# Patient Record
Sex: MALE | Race: BLACK OR AFRICAN AMERICAN | Employment: FULL TIME | URBAN - METROPOLITAN AREA
[De-identification: names, ages, dates, MRNs, and addresses within clinical notes are randomized per-mention and may not be internally consistent; named-entity substitution may affect disease eponyms.]

---

## 2017-01-13 ENCOUNTER — APPOINTMENT (OUTPATIENT)
Dept: GENERAL RADIOLOGY | Age: 42
End: 2017-01-13
Attending: PHYSICIAN ASSISTANT
Payer: OTHER MISCELLANEOUS

## 2017-01-13 ENCOUNTER — HOSPITAL ENCOUNTER (EMERGENCY)
Age: 42
Discharge: HOME | End: 2017-01-13
Attending: EMERGENCY MEDICINE
Payer: OTHER MISCELLANEOUS

## 2017-01-13 VITALS
OXYGEN SATURATION: 97 % | SYSTOLIC BLOOD PRESSURE: 117 MMHG | TEMPERATURE: 96.6 F | DIASTOLIC BLOOD PRESSURE: 71 MMHG | RESPIRATION RATE: 16 BRPM | BODY MASS INDEX: 33.34 KG/M2 | WEIGHT: 220 LBS | HEIGHT: 68 IN | HEART RATE: 94 BPM

## 2017-01-13 DIAGNOSIS — S09.92XA NASAL TRAUMA, INITIAL ENCOUNTER: Primary | ICD-10-CM

## 2017-01-13 PROCEDURE — 70160 X-RAY EXAM OF NASAL BONES: CPT

## 2017-01-13 PROCEDURE — 99282 EMERGENCY DEPT VISIT SF MDM: CPT

## 2017-01-13 NOTE — LETTER
[facility] 
[facility] EMERGENCY DEPT 
[address] [facility] [address] 
[phone] Work/School Note Date: 1/13/2017 To Whom It May concern: 
 
Josr Hand was seen and treated today in the emergency room by the following provider(s): 
Attending Provider: Rickey Messer DO Physician Assistant: Aletha Mckeon PA-C. Josr Hand may return to work on 1/16/2017. Sincerely, Aletha Mckeon PA-C
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
## 2017-01-14 NOTE — ED NOTES
I have reviewed discharge instructions with the patient. The patient verbalized understanding. Pt given both verbal and written D/C information and work note. Pt understands to F/U in ED for any new or worsening symptoms. Pt leaving ED now in stable condition, ambulatory, w/ no further questions or concerns at this time.
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
## 2017-05-07 RX ORDER — HYDROCHLOROTHIAZIDE 25 MG/1
TABLET ORAL
Qty: 90 TAB | Refills: 0 | Status: SHIPPED | COMMUNITY
Start: 2017-05-07 | End: 2017-08-12 | Stop reason: SDUPTHER

## 2017-08-14 RX ORDER — HYDROCHLOROTHIAZIDE 25 MG/1
TABLET ORAL
Qty: 90 TAB | Refills: 0 | Status: SHIPPED | OUTPATIENT
Start: 2017-08-14 | End: 2018-10-18 | Stop reason: ALTCHOICE

## 2018-10-18 ENCOUNTER — OFFICE VISIT (OUTPATIENT)
Dept: FAMILY MEDICINE CLINIC | Age: 43
End: 2018-10-18

## 2018-10-18 ENCOUNTER — HOSPITAL ENCOUNTER (OUTPATIENT)
Dept: LAB | Age: 43
Discharge: HOME | End: 2018-10-18
Payer: COMMERCIAL

## 2018-10-18 VITALS
OXYGEN SATURATION: 97 % | HEART RATE: 98 BPM | TEMPERATURE: 98.1 F | HEIGHT: 68 IN | BODY MASS INDEX: 35.46 KG/M2 | RESPIRATION RATE: 16 BRPM | DIASTOLIC BLOOD PRESSURE: 100 MMHG | WEIGHT: 234 LBS | SYSTOLIC BLOOD PRESSURE: 160 MMHG

## 2018-10-18 DIAGNOSIS — Z13.0 SCREENING FOR DEFICIENCY ANEMIA: ICD-10-CM

## 2018-10-18 DIAGNOSIS — R21 RASH: ICD-10-CM

## 2018-10-18 DIAGNOSIS — I10 ESSENTIAL HYPERTENSION: Primary | ICD-10-CM

## 2018-10-18 DIAGNOSIS — Z12.5 SCREENING FOR MALIGNANT NEOPLASM OF PROSTATE: ICD-10-CM

## 2018-10-18 PROBLEM — E66.01 SEVERE OBESITY (HCC): Status: ACTIVE | Noted: 2018-10-18

## 2018-10-18 PROCEDURE — 36415 COLL VENOUS BLD VENIPUNCTURE: CPT | Performed by: NURSE PRACTITIONER

## 2018-10-18 PROCEDURE — 84153 ASSAY OF PSA TOTAL: CPT | Performed by: NURSE PRACTITIONER

## 2018-10-18 PROCEDURE — 85018 HEMOGLOBIN: CPT | Performed by: NURSE PRACTITIONER

## 2018-10-18 PROCEDURE — 80061 LIPID PANEL: CPT | Performed by: NURSE PRACTITIONER

## 2018-10-18 PROCEDURE — 80053 COMPREHEN METABOLIC PANEL: CPT | Performed by: NURSE PRACTITIONER

## 2018-10-18 RX ORDER — CLOTRIMAZOLE AND BETAMETHASONE DIPROPIONATE 10; .64 MG/G; MG/G
CREAM TOPICAL
Qty: 15 G | Refills: 0 | Status: SHIPPED | OUTPATIENT
Start: 2018-10-18 | End: 2019-06-25 | Stop reason: SDUPTHER

## 2018-10-18 RX ORDER — LOSARTAN POTASSIUM AND HYDROCHLOROTHIAZIDE 25; 100 MG/1; MG/1
1 TABLET ORAL DAILY
Qty: 30 TAB | Refills: 5 | Status: SHIPPED | OUTPATIENT
Start: 2018-10-18 | End: 2018-11-15 | Stop reason: ALTCHOICE

## 2018-10-18 NOTE — PROGRESS NOTES
HISTORY OF PRESENT ILLNESS  Bradly Christopher is a 37 y.o. male. Patient presents for rash  HPI  Rash started about 2 months ago. It does not itch. Pt is not taking his blood pressure medication. He ran out. Review of Systems   Constitutional: Negative. Eyes: Negative. Respiratory: Negative. Cardiovascular: Negative. Skin: Positive for itching and rash. Visit Vitals  BP (!) 160/100 (BP 1 Location: Left arm, BP Patient Position: Sitting)   Pulse 98   Temp 98.1 °F (36.7 °C) (Oral)   Resp 16   Ht 5' 8\" (1.727 m)   Wt 234 lb (106.1 kg)   SpO2 97%   BMI 35.58 kg/m²     Physical Exam   Constitutional: He is oriented to person, place, and time. He appears well-developed. No distress. HENT:   Head: Normocephalic and atraumatic. Right Ear: External ear normal.   Left Ear: External ear normal.   Nose: Nose normal.   Mouth/Throat: Oropharynx is clear and moist. No oropharyngeal exudate. Eyes: Conjunctivae and EOM are normal. Pupils are equal, round, and reactive to light. Neck: Normal range of motion. Neck supple. Cardiovascular: Normal rate and regular rhythm. No murmur heard. Pulmonary/Chest: Breath sounds normal. No respiratory distress. He has no wheezes. He has no rales. Abdominal: Soft. Bowel sounds are normal. He exhibits no distension and no mass. There is no tenderness. There is no rebound and no guarding. Genitourinary: Penis normal.   Musculoskeletal: Normal range of motion. He exhibits no edema. Neurological: He is alert and oriented to person, place, and time. No cranial nerve deficit. Skin: Rash noted. Rash is macular. Rash is not papular, not pustular, not vesicular and not urticarial.   Psychiatric: He has a normal mood and affect. His behavior is normal. Judgment and thought content normal.   on the right flank area there is a cluster of lesions that are round varying in size. ASSESSMENT and PLAN    ICD-10-CM ICD-9-CM    1.  Essential hypertension I10 401.9 losartan-hydroCHLOROthiazide (HYZAAR) 100-25 mg per tablet      METABOLIC PANEL, COMPREHENSIVE      LIPID PANEL   2. Rash R21 782.1    3. Screening for malignant neoplasm of prostate Z12.5 V76.44 PSA SCREENING (SCREENING)   4. Screening for deficiency anemia Z13.0 V78.1 HGB & HCT     PLAN:  We discussed his blood pressure and treatment option. I have reviewed/discussed the above normal BMI with the patient. I have recommended the following interventions: dietary management education, guidance, and counseling, encourage exercise and monitor weight . .      I asked Pt to RTC in 4 weeks for blood pressure recheck. Skin care: Pt advised to use small amount bid until clear. If rash persist or worsens. I have discussed the diagnosis with the patient and the intended plan as seen in the above orders. The patient has received an after-visit summary and questions were answered concerning future plans. I have discussed medication side effects and warnings with the patient as well. Patient will call for further questions. Follow-up Disposition:  Return in about 4 weeks (around 11/15/2018) for blood pressure check.

## 2018-10-18 NOTE — PROGRESS NOTES
Subjective:  
Chela Mcnulty is a 37 y.o. male presenting for his annual checkup. ROS:  Feeling well. No dyspnea or chest pain on exertion. No abdominal pain, change in bowel habits, black or bloody stools. No urinary tract or prostatic symptoms. No neurological complaints. Specific concerns today: ***. 
 
{Choose one or more SmartLinks; press DELETE if none desired:[id number]} {Choose one or more Last Lab values; press DELETE if none desired:[id number]} Objective:  
 
Visit Vitals BP (!) 160/100 (BP 1 Location: Left arm, BP Patient Position: Sitting) Pulse 98 Temp 98.1 °F (36.7 °C) (Oral) Resp 16 Ht 5' 8\" (1.727 m) Wt 234 lb (106.1 kg) SpO2 97% BMI 35.58 kg/m² The patient appears well, alert, oriented x 3, in no distress. ENT normal.  Neck supple. No adenopathy or thyromegaly. MARTI. Lungs are clear, good air entry, no wheezes, rhonchi or rales. S1 and S2 normal, no murmurs, regular rate and rhythm. Abdomen is soft without tenderness, guarding, mass or organomegaly.  exam: {pe male genitalia:804018::\"no penile lesions or discharge, no testicular masses or tenderness, no hernias\"}. Extremities show no edema, normal peripheral pulses. Neurological is normal without focal findings. Assessment/Plan:  
healthy adult male 
{disease follow up plans:475243}. {Assessment and Plan:53695}.

## 2018-10-18 NOTE — PROGRESS NOTES
Chief Complaint   Patient presents with    Well Male     1. Have you been to the ER, urgent care clinic since your last visit? Hospitalized since your last visit? No    2. Have you seen or consulted any other health care providers outside of the [address] [place] [address] since your last visit? Include any pap smears or colon screening.  No

## 2018-10-19 LAB
ALBUMIN SERPL-MCNC: 4.2 G/DL (ref 3.4–5)
ALBUMIN/GLOB SERPL: 1.2 {RATIO} (ref 0.8–1.7)
ALP SERPL-CCNC: 124 U/L (ref 45–117)
ALT SERPL-CCNC: 65 U/L (ref 16–61)
ANION GAP SERPL CALC-SCNC: 6 MMOL/L (ref 3–18)
AST SERPL-CCNC: 39 U/L (ref 15–37)
BILIRUB SERPL-MCNC: 0.5 MG/DL (ref 0.2–1)
BUN SERPL-MCNC: 15 MG/DL (ref 7–18)
BUN/CREAT SERPL: 12 (ref 12–20)
CALCIUM SERPL-MCNC: 8.6 MG/DL (ref 8.5–10.1)
CHLORIDE SERPL-SCNC: 105 MMOL/L (ref 100–108)
CHOLEST SERPL-MCNC: 216 MG/DL
CO2 SERPL-SCNC: 27 MMOL/L (ref 21–32)
CREAT SERPL-MCNC: 1.22 MG/DL (ref 0.6–1.3)
GLOBULIN SER CALC-MCNC: 3.4 G/DL (ref 2–4)
GLUCOSE SERPL-MCNC: 97 MG/DL (ref 74–99)
HCT VFR BLD AUTO: 44.4 % (ref 36–48)
HDLC SERPL-MCNC: 33 MG/DL (ref 40–60)
HDLC SERPL: 6.5 {RATIO} (ref 0–5)
HGB BLD-MCNC: 14.7 G/DL (ref 13–16)
LDLC SERPL CALC-MCNC: 148.4 MG/DL (ref 0–100)
LIPID PROFILE,FLP: ABNORMAL
POTASSIUM SERPL-SCNC: 4.3 MMOL/L (ref 3.5–5.5)
PROT SERPL-MCNC: 7.6 G/DL (ref 6.4–8.2)
PSA SERPL-MCNC: 0.7 NG/ML (ref 0–4)
SODIUM SERPL-SCNC: 138 MMOL/L (ref 136–145)
TRIGL SERPL-MCNC: 173 MG/DL (ref ?–150)
VLDLC SERPL CALC-MCNC: 34.6 MG/DL

## 2018-10-20 NOTE — PROGRESS NOTES
Please advise Pt that his cholesterol is 216 up from 201 and his LDL is 148 up from 126. Remind him to watch his diet. There is no signs of anemia  His PSA is in normal range. His liver functions are slightly off, so ask him to use less tylenol and NSAIDS, if he takes those.

## 2018-11-15 ENCOUNTER — OFFICE VISIT (OUTPATIENT)
Dept: FAMILY MEDICINE CLINIC | Age: 43
End: 2018-11-15

## 2018-11-15 VITALS
HEIGHT: 68 IN | OXYGEN SATURATION: 98 % | RESPIRATION RATE: 18 BRPM | SYSTOLIC BLOOD PRESSURE: 142 MMHG | HEART RATE: 99 BPM | BODY MASS INDEX: 35.92 KG/M2 | DIASTOLIC BLOOD PRESSURE: 79 MMHG | WEIGHT: 237 LBS | TEMPERATURE: 98 F

## 2018-11-15 DIAGNOSIS — I10 ESSENTIAL HYPERTENSION: Primary | ICD-10-CM

## 2018-11-15 RX ORDER — LISINOPRIL AND HYDROCHLOROTHIAZIDE 20; 25 MG/1; MG/1
1 TABLET ORAL DAILY
Qty: 90 TAB | Refills: 1 | Status: SHIPPED | OUTPATIENT
Start: 2018-11-15 | End: 2019-06-25 | Stop reason: SDUPTHER

## 2018-11-15 NOTE — PROGRESS NOTES
HISTORY OF PRESENT ILLNESS  Danielle Aponte is a 37 y.o. male. Patient presents for blood pressure check. HPI  Losartan re-call  Pt was tolerating the Losartan well, with no side effects. Review of Systems   Constitutional: Negative. Eyes: Negative. Respiratory: Negative. Cardiovascular: Negative. Genitourinary: Negative. Visit Vitals  /79 (BP 1 Location: Left arm, BP Patient Position: Sitting)   Pulse 99   Temp 98 °F (36.7 °C) (Oral)   Resp 18   Ht 5' 8\" (1.727 m)   Wt 237 lb (107.5 kg)   SpO2 98%   BMI 36.04 kg/m²     Physical Exam   Constitutional: He appears well-developed. No distress. Cardiovascular: Normal rate, regular rhythm and normal heart sounds. No murmur heard. Pulmonary/Chest: Effort normal and breath sounds normal. No respiratory distress. He has no wheezes. He has no rales. ASSESSMENT and PLAN    ICD-10-CM ICD-9-CM    1. Essential hypertension I10 401.9 lisinopril-hydroCHLOROthiazide (PRINZIDE, ZESTORETIC) 20-25 mg per tablet     PLAN  Because of re-call, pt was switched to Lisinopril 20/HCTZ 25mg    Pt is to call with any concerns. I have discussed the diagnosis with the patient and the intended plan as seen in the above orders. The patient has received an after-visit summary and questions were answered concerning future plans. I have discussed medication side effects and warnings with the patient as well. Patient will call for further questions. Follow-up Disposition:  Return in about 3 months (around 2/15/2019) for chronic care.

## 2018-11-15 NOTE — PROGRESS NOTES
Chief Complaint   Patient presents with    Follow-up     hypertension 4 week follow up     1. Have you been to the ER, urgent care clinic since your last visit? Hospitalized since your last visit? No    2. Have you seen or consulted any other health care providers outside of the [address] [place] [address] since your last visit? Include any pap smears or colon screening.  No

## 2019-05-24 DIAGNOSIS — I10 ESSENTIAL HYPERTENSION: ICD-10-CM

## 2019-05-24 RX ORDER — LISINOPRIL AND HYDROCHLOROTHIAZIDE 20; 25 MG/1; MG/1
TABLET ORAL
Qty: 90 TAB | Refills: 1 | OUTPATIENT
Start: 2019-05-24

## 2019-06-03 DIAGNOSIS — I10 ESSENTIAL HYPERTENSION: ICD-10-CM

## 2019-06-04 RX ORDER — LISINOPRIL AND HYDROCHLOROTHIAZIDE 20; 25 MG/1; MG/1
1 TABLET ORAL DAILY
Qty: 90 TAB | Refills: 1 | OUTPATIENT
Start: 2019-06-04

## 2019-06-25 ENCOUNTER — HOSPITAL ENCOUNTER (OUTPATIENT)
Dept: LAB | Age: 44
Discharge: HOME | End: 2019-06-25
Payer: COMMERCIAL

## 2019-06-25 ENCOUNTER — OFFICE VISIT (OUTPATIENT)
Dept: FAMILY MEDICINE CLINIC | Age: 44
End: 2019-06-25

## 2019-06-25 VITALS
TEMPERATURE: 98.1 F | HEART RATE: 89 BPM | DIASTOLIC BLOOD PRESSURE: 86 MMHG | BODY MASS INDEX: 36.22 KG/M2 | RESPIRATION RATE: 16 BRPM | WEIGHT: 239 LBS | SYSTOLIC BLOOD PRESSURE: 137 MMHG | OXYGEN SATURATION: 98 % | HEIGHT: 68 IN

## 2019-06-25 DIAGNOSIS — I10 ESSENTIAL HYPERTENSION: ICD-10-CM

## 2019-06-25 DIAGNOSIS — E78.2 MIXED HYPERLIPIDEMIA: Primary | ICD-10-CM

## 2019-06-25 DIAGNOSIS — E78.2 MIXED HYPERLIPIDEMIA: ICD-10-CM

## 2019-06-25 DIAGNOSIS — A60.00 GENITAL HERPES: ICD-10-CM

## 2019-06-25 DIAGNOSIS — L30.9 DERMATITIS: ICD-10-CM

## 2019-06-25 DIAGNOSIS — E66.01 SEVERE OBESITY (HCC): ICD-10-CM

## 2019-06-25 LAB
ALBUMIN SERPL-MCNC: 4.5 G/DL (ref 3.4–5)
ALBUMIN/GLOB SERPL: 1.3 {RATIO} (ref 0.8–1.7)
ALP SERPL-CCNC: 102 U/L (ref 45–117)
ALT SERPL-CCNC: 71 U/L (ref 16–61)
ANION GAP SERPL CALC-SCNC: 10 MMOL/L (ref 3–18)
AST SERPL-CCNC: 53 U/L (ref 15–37)
BILIRUB SERPL-MCNC: 0.9 MG/DL (ref 0.2–1)
BUN SERPL-MCNC: 8 MG/DL (ref 7–18)
BUN/CREAT SERPL: 7 (ref 12–20)
CALCIUM SERPL-MCNC: 9.3 MG/DL (ref 8.5–10.1)
CHLORIDE SERPL-SCNC: 102 MMOL/L (ref 100–108)
CHOLEST SERPL-MCNC: 226 MG/DL
CO2 SERPL-SCNC: 27 MMOL/L (ref 21–32)
CREAT SERPL-MCNC: 1.19 MG/DL (ref 0.6–1.3)
GLOBULIN SER CALC-MCNC: 3.4 G/DL (ref 2–4)
GLUCOSE SERPL-MCNC: 118 MG/DL (ref 74–99)
HDLC SERPL-MCNC: 40 MG/DL (ref 40–60)
HDLC SERPL: 5.7 {RATIO} (ref 0–5)
LDLC SERPL CALC-MCNC: 154 MG/DL (ref 0–100)
LIPID PROFILE,FLP: ABNORMAL
POTASSIUM SERPL-SCNC: 3.8 MMOL/L (ref 3.5–5.5)
PROT SERPL-MCNC: 7.9 G/DL (ref 6.4–8.2)
SODIUM SERPL-SCNC: 139 MMOL/L (ref 136–145)
TRIGL SERPL-MCNC: 160 MG/DL (ref ?–150)
VLDLC SERPL CALC-MCNC: 32 MG/DL

## 2019-06-25 PROCEDURE — 80061 LIPID PANEL: CPT

## 2019-06-25 PROCEDURE — 36415 COLL VENOUS BLD VENIPUNCTURE: CPT

## 2019-06-25 PROCEDURE — 80053 COMPREHEN METABOLIC PANEL: CPT

## 2019-06-25 RX ORDER — LISINOPRIL AND HYDROCHLOROTHIAZIDE 20; 25 MG/1; MG/1
1 TABLET ORAL DAILY
Qty: 90 TAB | Refills: 1 | Status: SHIPPED | OUTPATIENT
Start: 2019-06-25 | End: 2019-12-11 | Stop reason: SDUPTHER

## 2019-06-25 RX ORDER — CLOTRIMAZOLE AND BETAMETHASONE DIPROPIONATE 10; .64 MG/G; MG/G
CREAM TOPICAL
Qty: 15 G | Refills: 0 | Status: SHIPPED | OUTPATIENT
Start: 2019-06-25 | End: 2020-01-18 | Stop reason: SDUPTHER

## 2019-06-25 RX ORDER — VALACYCLOVIR HYDROCHLORIDE 1 G/1
TABLET, FILM COATED ORAL
Qty: 30 TAB | Refills: 3 | Status: SHIPPED | OUTPATIENT
Start: 2019-06-25 | End: 2020-06-18 | Stop reason: SDUPTHER

## 2019-06-25 RX ORDER — CLOBETASOL PROPIONATE 0.46 MG/ML
SOLUTION TOPICAL
Qty: 50 ML | Refills: 2 | Status: SHIPPED | OUTPATIENT
Start: 2019-06-25 | End: 2020-07-20 | Stop reason: SDUPTHER

## 2019-06-25 NOTE — PROGRESS NOTES
Chief Complaint   Patient presents with    Follow Up Chronic Condition    Hair/Scalp Problem     itchy     1. Have you been to the ER, urgent care clinic since your last visit? Hospitalized since your last visit? No    2. Have you seen or consulted any other health care providers outside of the [address] [place] [address] since your last visit? Include any pap smears or colon screening.  No

## 2019-06-25 NOTE — PROGRESS NOTES
HISTORY OF PRESENT ILLNESS  Milena Murdock is a 37 y.o. male. Patient present for chronic care. Chief Complaint   Patient presents with    Follow Up Chronic Condition    Hair/Scalp Problem     itchy       HPI  Pt is here for refills. He has a HSV II outbreak about once a year and takes the valtrex just at that time. Review of Systems   Constitutional: Negative. HENT: Negative. Eyes: Negative. Respiratory: Negative. Cardiovascular: Negative. Skin: Positive for itching and rash. Visit Vitals  /86 (BP 1 Location: Left arm, BP Patient Position: Sitting)   Pulse 89   Temp 98.1 °F (36.7 °C) (Oral)   Resp 16   Ht 5' 8\" (1.727 m)   Wt 239 lb (108.4 kg)   SpO2 98%   BMI 36.34 kg/m²     Physical Exam   Constitutional: He is oriented to person, place, and time. He appears well-developed. No distress. Cardiovascular: Normal rate, regular rhythm and normal heart sounds. No murmur heard. Pulmonary/Chest: Effort normal and breath sounds normal. No respiratory distress. He has no wheezes. He has no rales. Neurological: He is alert and oriented to person, place, and time. skin: scalp  There are patches of raised red papules with tiny blister formation on some. I have reviewed/discussed the above normal BMI with the patient. I have recommended the following interventions: dietary management education, guidance, and counseling and encourage exercise . [facility] ASSESSMENT and PLAN    ICD-10-CM ICD-9-CM    1. Mixed hyperlipidemia E78.2 272.2 LIPID PANEL   2. Essential hypertension I10 401.9 lisinopril-hydroCHLOROthiazide (PRINZIDE, ZESTORETIC) 20-25 mg per tablet      METABOLIC PANEL, COMPREHENSIVE   3. Genital herpes A60.00 054.10 valACYclovir (VALTREX) 1 gram tablet   4. Severe obesity ([facility] [address] [address] E66.01 278.01    5. Dermatitis L30.9 692.9 clobetasol (TEMOVATE) 0.05 % external solution     PLAN:  Pt will work on diet and exercise. We discussed HSV treatment for outbreaks verses prophylaxis.   Pt would like to take just during outbreak so he was advised Valtrex 1000mg tid x7 days. I have discussed the diagnosis with the patient and the intended plan as seen in the above orders. The patient has received an after-visit summary and questions were answered concerning future plans. I have discussed medication side effects and warnings with the patient as well. Patient will call for further questions. Follow-up and Dispositions    · Return in about 6 months (around 12/25/2019) for chronic care.

## 2019-12-11 DIAGNOSIS — I10 ESSENTIAL HYPERTENSION: ICD-10-CM

## 2019-12-11 RX ORDER — LISINOPRIL AND HYDROCHLOROTHIAZIDE 20; 25 MG/1; MG/1
1 TABLET ORAL DAILY
Qty: 30 TAB | Refills: 0 | Status: SHIPPED | OUTPATIENT
Start: 2019-12-11 | End: 2020-01-20 | Stop reason: SDUPTHER

## 2019-12-31 ENCOUNTER — OFFICE VISIT (OUTPATIENT)
Dept: FAMILY MEDICINE CLINIC | Age: 44
End: 2019-12-31

## 2019-12-31 DIAGNOSIS — N52.9 ERECTILE DYSFUNCTION, UNSPECIFIED ERECTILE DYSFUNCTION TYPE: ICD-10-CM

## 2019-12-31 DIAGNOSIS — Z91.14 NON COMPLIANCE W MEDICATION REGIMEN: ICD-10-CM

## 2019-12-31 DIAGNOSIS — G89.29 CHRONIC BILATERAL LOW BACK PAIN WITHOUT SCIATICA: Primary | ICD-10-CM

## 2019-12-31 DIAGNOSIS — M54.50 CHRONIC BILATERAL LOW BACK PAIN WITHOUT SCIATICA: Primary | ICD-10-CM

## 2019-12-31 RX ORDER — CYCLOBENZAPRINE HCL 10 MG
10 TABLET ORAL
Qty: 45 TAB | Refills: 0 | Status: SHIPPED | OUTPATIENT
Start: 2019-12-31 | End: 2020-01-20 | Stop reason: ALTCHOICE

## 2019-12-31 RX ORDER — IBUPROFEN 800 MG/1
800 TABLET ORAL
Qty: 45 TAB | Refills: 0 | Status: SHIPPED | OUTPATIENT
Start: 2019-12-31 | End: 2020-01-20 | Stop reason: ALTCHOICE

## 2019-12-31 NOTE — PATIENT INSTRUCTIONS
Erectile Dysfunction: Care Instructions  Your Care Instructions    A man has erectile dysfunction (ED) when he routinely can't get or keep an erection that allows satisfactory sex. He may not be able to have an erection at any time. Or he may not be able to have one that is firm enough or lasts long enough to complete intercourse. ED is not the same as having trouble getting an erection now and then. That's common. It happens to most men at some time. ED can be caused by problems with the blood vessels, nerves, or hormones. It can be caused by diabetes, heart disease, and injuries. Nerve disorders, such as multiple sclerosis or Parkinson's disease, can also cause it. ED can also be caused by medicines, alcohol, and tobacco. Or it may be caused by depression, stress, grief, or relationship problems. Follow-up care is a key part of your treatment and safety. Be sure to make and go to all appointments, and call your doctor if you are having problems. It's also a good idea to know your test results and keep a list of the medicines you take. How can you care for yourself at home?   Lifestyle    · Limit alcohol. Have no more than 2 drinks a day.     · Do not smoke. Smoking makes it harder for the blood vessels in the penis to relax and let blood flow in. If you need help quitting, talk to your doctor about stop-smoking programs and medicines. These can increase your chances of quitting for good.     · Do not use cocaine, heroin, or other illegal drugs.     · Try to reduce stress.     · Give yourself time to adjust to change. Changes in your job, family, relationships, home life, and other areas can cause stress. And stress can cause erection problems.    Work with your partner    · Don't assume that you know what your partner likes when it comes to sex. You may be wrong. Talk about what each of you does and does not enjoy.     · Make time outside of the bedroom to talk about your sex life.  If you avoid sex because you are afraid of having erection problems, your partner may worry that you are no longer interested.     · If you and your partner have trouble talking about sex, see a therapist who can help you talk about it. Reading books with your partner about sexual health may also help.     · Relax. Take time for more foreplay. Worrying about your erections may only make things worse. Medicines    · Tell your doctor about all the medicines that you take. ? Some medicines can cause erection problems. ? Some medicines can have dangerous interactions with medicines that are prescribed for ED, including over-the-counter medicines and herbal products.     · Be safe with medicines. Take your medicines exactly as prescribed. Call your doctor if you think you are having a problem with your medicine.     · Talk to your doctor about trying a medicine to help you keep an erection. This could be a medicine such as Viagra, Levitra, or Cialis. If you have a heart problem, ask your doctor if these are safe for you. Do not take these medicines if you take nitroglycerin or other nitrate medicine. When should you call for help? Call your doctor now or seek immediate medical care if:    · You took a medicine for erectile dysfunction and you have an erection that lasts longer than 3 hours.    Watch closely for changes in your health, and be sure to contact your doctor if you have any problems. Where can you learn more? Go to http://deon-darshan.info/. Enter [id number] [id number] in the search box to learn more about \"Erectile Dysfunction: Care Instructions. \"  Current as of: May 28, 2019  Content Version: 12.2  © 6031-0780 [employer]. Care instructions adapted under license by [employer] (which disclaims liability or warranty for this information).  If you have questions about a medical condition or this instruction, always ask your healthcare professional. [address] any warranty or liability for your use of this information. Learning About Relief for Back Pain  What is back tension and strain? Back strain happens when you overstretch, or pull, a muscle in your back. You may hurt your back in an accident or when you exercise or lift something. Most back pain will get better with rest and time. You can take care of yourself at home to help your back heal.  What can you do first to relieve back pain? When you first feel back pain, try these steps:  · Walk. Take a short walk (10 to 20 minutes) on a level surface (no slopes, hills, or stairs) every 2 to 3 hours. Walk only distances you can manage without pain, especially leg pain. · Relax. Find a comfortable position for rest. Some people are comfortable on the floor or a medium-firm bed with a small pillow under their head and another under their knees. Some people prefer to lie on their side with a pillow between their knees. Don't stay in one position for too long. · Try heat or ice. Try using a heating pad on a low or medium setting, or take a warm shower, for 15 to 20 minutes every 2 to 3 hours. Or you can buy single-use heat wraps that last up to 8 hours. You can also try an ice pack for 10 to 15 minutes every 2 to 3 hours. You can use an ice pack or a bag of frozen vegetables wrapped in a thin towel. There is not strong evidence that either heat or ice will help, but you can try them to see if they help. You may also want to try switching between heat and cold. · Take pain medicine exactly as directed. ? If the doctor gave you a prescription medicine for pain, take it as prescribed. ? If you are not taking a prescription pain medicine, ask your doctor if you can take an over-the-counter medicine. What else can you do? · Stretch and exercise. Exercises that increase flexibility may relieve your pain and make it easier for your muscles to keep your spine in a good, neutral position.  And don't forget to keep walking. · Do self-massage. You can use self-massage to unwind after work or school or to energize yourself in the morning. You can easily massage your feet, hands, or neck. Self-massage works best if you are in comfortable clothes and are sitting or lying in a comfortable position. Use oil or lotion to massage bare skin. · Reduce stress. Back pain can lead to a vicious [place]: Distress about the pain tenses the muscles in your back, which in turn causes more pain. Learn how to relax your mind and your muscles to lower your stress. Where can you learn more? Go to http://deon-darshan.info/. Enter Q238 in the search box to learn more about \"Learning About Relief for Back Pain. \"  Current as of: June 26, 2019  Content Version: 12.2  © 3897-9069 [employer]. Care instructions adapted under license by [employer] (which disclaims liability or warranty for this information). If you have questions about a medical condition or this instruction, always ask your healthcare professional. [address] any warranty or liability for your use of this information.

## 2019-12-31 NOTE — PROGRESS NOTES
Chief Complaint   Patient presents with    Back Pain    Erectile Dysfunction     1. Have you been to the ER, urgent care clinic since your last visit? Hospitalized since your last visit? no    2. Have you seen or consulted any other health care providers outside of the [address] [place] [address] since your last visit? Include any pap smears or colon screening.  no

## 2019-12-31 NOTE — PROGRESS NOTES
HISTORY OF PRESENT ILLNESS  Camilla Morales is a 40 y.o. male. Patient presents today for further evaluation of back pain. States pain has been present for the last year. Comments pain has increased over the last 6 months. States he has a remote injury 3 years ago while working a [facility] [employer] [facility] facility. Per patient he was informed it was pulled muscle at the time. States pain increases with walking and standing. States he works in [employer] and walking the halls and climbing stairs. States when he recently went to [facility] walking became painful after walking for a while. States he has tried resting, otc ibuprofen, lidoderm patches with some improvement. Unsure if he has had imaging in the past.  Comments pain does radiate down bilateral thighs with climbing stairs. Denies weakness. ED: states symptoms have been present for the last year. Comments symptoms are intermittent. States he tried otc treatment without improvement. States he trouble with obtaining and sustaining an erection. Patient admits to not taking his lisinopril hct routinely as instructed. States he thought this may be been causing his ED. No Known Allergies  Current Outpatient Medications   Medication Sig Dispense Refill    lisinopril-hydroCHLOROthiazide (PRINZIDE, ZESTORETIC) 20-25 mg per tablet Take 1 Tab by mouth daily. 30 Tab 0    valACYclovir (VALTREX) 1 gram tablet Take tid x7 days during outbreak.  30 Tab 3    clotrimazole-betamethasone (LOTRISONE) topical cream Use small amount bid until clear 15 g 0    clobetasol (TEMOVATE) 0.05 % external solution Use bid 50 mL 2     Past Medical History:   Diagnosis Date    Genital herpes 3/2/2015    Genital herpes in men     Hypertension     Prediabetes      Social History     Socioeconomic History    Marital status: UNKNOWN     Spouse name: Not on file    Number of children: Not on file    Years of education: Not on file    Highest education level: Not on file   Occupational History    Not on file   Social Needs    Financial resource strain: Not on file    Food insecurity:     Worry: Not on file     Inability: Not on file    Transportation needs:     Medical: Not on file     Non-medical: Not on file   Tobacco Use    Smoking status: Never Smoker    Smokeless tobacco: Never Used   Substance and Sexual Activity    Alcohol use: No    Drug use: No    Sexual activity: Not on file   Lifestyle    Physical activity:     Days per week: Not on file     Minutes per session: Not on file    Stress: Not on file   Relationships    Social connections:     Talks on phone: Not on file     Gets together: Not on file     Attends [employer] service: Not on file     Active member of club or organization: Not on file     Attends meetings of clubs or organizations: Not on file     Relationship status: Not on file    Intimate partner violence:     Fear of current or ex partner: Not on file     Emotionally abused: Not on file     Physically abused: Not on file     Forced sexual activity: Not on file   Other Topics Concern    Not on file   Social History Narrative    Not on file     Wt Readings from Last 3 Encounters:   12/31/19 246 lb (111.6 kg)   06/25/19 239 lb (108.4 kg)   11/15/18 237 lb (107.5 kg)     BP Readings from Last 3 Encounters:   12/31/19 164/90   06/25/19 137/86   11/15/18 142/79     Review of Systems   Respiratory: Negative for shortness of breath. Cardiovascular: Negative for chest pain and palpitations. Musculoskeletal: Positive for back pain (lower). Neurological: Negative for focal weakness. /90 (BP 1 Location: Right arm, BP Patient Position: Sitting)   Pulse 87   Temp 98.2 °F (36.8 °C) (Oral)   Resp 18   Ht 5' 8\" (1.727 m)   Wt 246 lb (111.6 kg)   SpO2 98%   BMI 37.40 kg/m²     Physical Exam  Constitutional:       Appearance: He is well-developed. HENT:      Head: Normocephalic and atraumatic.    Neck:      Musculoskeletal: Normal range of motion and neck supple. Cardiovascular:      Rate and Rhythm: Normal rate and regular rhythm. Heart sounds: Normal heart sounds. No murmur. No friction rub. No gallop. Pulmonary:      Effort: Pulmonary effort is normal.      Breath sounds: Normal breath sounds. No wheezing, rhonchi or rales. Musculoskeletal:      Lumbar back: He exhibits tenderness. Skin:     General: Skin is warm and dry. Neurological:      Mental Status: He is alert and oriented to person, place, and time. ASSESSMENT and PLAN    ICD-10-CM ICD-9-CM    1. Chronic bilateral low back pain without sciatica M54.5 724.2 XR SPINE LUMB MIN 4 V    G89.29 338.29    2. Erectile dysfunction, unspecified erectile dysfunction type N52.9 607.84    3. Non compliance w medication regimen Z91.14 V15.81      Orders Placed This Encounter    XR SPINE LUMB MIN 4 V    ibuprofen (MOTRIN) 800 mg tablet    cyclobenzaprine (FLEXERIL) 10 mg tablet       Discussed risk associated with uncontrolled blood pressure to include but not limited to MI, CVA, etc  Informed will need to have blood pressure better controlled prior to beginning any medication for ED   Stretches for back pain provided and reviewed  I have discussed the diagnosis with the patient and the intended plan as seen in the above orders. The patient has received an after-visit summary and questions were answered concerning future plans. I have discussed medication side effects and warnings with the patient as well. Patient agreeable with above plan and verbalizes understanding. Follow-up and Dispositions    · Return in about 3 weeks (around 1/21/2020) for back pain.

## 2020-01-05 VITALS
SYSTOLIC BLOOD PRESSURE: 164 MMHG | HEART RATE: 87 BPM | DIASTOLIC BLOOD PRESSURE: 100 MMHG | OXYGEN SATURATION: 98 % | RESPIRATION RATE: 18 BRPM | BODY MASS INDEX: 37.28 KG/M2 | WEIGHT: 246 LBS | TEMPERATURE: 98.2 F | HEIGHT: 68 IN

## 2020-01-15 ENCOUNTER — HOSPITAL ENCOUNTER (OUTPATIENT)
Dept: GENERAL RADIOLOGY | Age: 45
Discharge: HOME | End: 2020-01-15
Payer: COMMERCIAL

## 2020-01-15 DIAGNOSIS — G89.29 CHRONIC BILATERAL LOW BACK PAIN WITHOUT SCIATICA: ICD-10-CM

## 2020-01-15 DIAGNOSIS — M54.50 CHRONIC BILATERAL LOW BACK PAIN WITHOUT SCIATICA: ICD-10-CM

## 2020-01-15 PROCEDURE — 72110 X-RAY EXAM L-2 SPINE 4/>VWS: CPT

## 2020-01-20 ENCOUNTER — OFFICE VISIT (OUTPATIENT)
Dept: FAMILY MEDICINE CLINIC | Age: 45
End: 2020-01-20

## 2020-01-20 VITALS
WEIGHT: 246.6 LBS | OXYGEN SATURATION: 98 % | SYSTOLIC BLOOD PRESSURE: 148 MMHG | BODY MASS INDEX: 37.38 KG/M2 | DIASTOLIC BLOOD PRESSURE: 89 MMHG | HEART RATE: 93 BPM | TEMPERATURE: 98.3 F | RESPIRATION RATE: 16 BRPM | HEIGHT: 68 IN

## 2020-01-20 DIAGNOSIS — M54.50 CHRONIC BILATERAL LOW BACK PAIN WITHOUT SCIATICA: ICD-10-CM

## 2020-01-20 DIAGNOSIS — N52.9 ERECTILE DYSFUNCTION, UNSPECIFIED ERECTILE DYSFUNCTION TYPE: Primary | ICD-10-CM

## 2020-01-20 DIAGNOSIS — G89.29 CHRONIC BILATERAL LOW BACK PAIN WITHOUT SCIATICA: ICD-10-CM

## 2020-01-20 DIAGNOSIS — I10 ESSENTIAL HYPERTENSION: ICD-10-CM

## 2020-01-20 RX ORDER — LISINOPRIL AND HYDROCHLOROTHIAZIDE 20; 25 MG/1; MG/1
1 TABLET ORAL DAILY
Qty: 90 TAB | Refills: 1 | Status: SHIPPED | OUTPATIENT
Start: 2020-01-20 | End: 2020-07-20 | Stop reason: SDUPTHER

## 2020-01-20 RX ORDER — CLOTRIMAZOLE AND BETAMETHASONE DIPROPIONATE 10; .64 MG/G; MG/G
CREAM TOPICAL
Qty: 15 G | Refills: 0 | Status: SHIPPED | OUTPATIENT
Start: 2020-01-20 | End: 2020-02-08 | Stop reason: SDUPTHER

## 2020-01-20 RX ORDER — TADALAFIL 20 MG/1
20 TABLET ORAL AS NEEDED
Qty: 9 TAB | Refills: 5 | Status: SHIPPED | OUTPATIENT
Start: 2020-01-20 | End: 2020-05-04 | Stop reason: SDUPTHER

## 2020-01-20 RX ORDER — NAPROXEN SODIUM 550 MG/1
550 TABLET ORAL 2 TIMES DAILY WITH MEALS
Qty: 30 TAB | Refills: 1 | Status: SHIPPED | OUTPATIENT
Start: 2020-01-20 | End: 2020-05-04 | Stop reason: SDUPTHER

## 2020-01-20 RX ORDER — ORPHENADRINE CITRATE 100 MG/1
100 TABLET, EXTENDED RELEASE ORAL 2 TIMES DAILY
Qty: 30 TAB | Refills: 1 | Status: SHIPPED | OUTPATIENT
Start: 2020-01-20 | End: 2020-05-04 | Stop reason: SDUPTHER

## 2020-01-20 NOTE — TELEPHONE ENCOUNTER
Last Visit: 12/31/19 with JABARI Rod  Next Appointment: today 1/20/20 with JABARI Ray  Previous Refill Encounter(s): 6/25/19 #15g    Requested Prescriptions     Pending Prescriptions Disp Refills    clotrimazole-betamethasone (LOTRISONE) topical cream 15 g 0     Sig: Use small amount bid until clear

## 2020-01-20 NOTE — PROGRESS NOTES
Pt is here for follow up on back pain  Discuss ED    1. Have you been to the ER, urgent care clinic since your last visit? Hospitalized since your last visit? No    2. Have you seen or consulted any other health care providers outside of the [address] [place] [address] since your last visit? Include any pap smears or colon screening.  No

## 2020-01-20 NOTE — PROGRESS NOTES
HISTORY OF PRESENT ILLNESS  Brianne Barnett is a 40 y.o. male. Patient presents for follow up back pain. HPI  Pt was given Motrin and Flexeril. The flexeril makes him tired  His back has been steadily worsening over the months. Pt would like to talk about ED    He needs refill on his blood pressure medicine. Review of Systems   Constitutional: Negative. Respiratory: Negative. Cardiovascular: Negative. Musculoskeletal: Positive for back pain. Visit Vitals  /89 (BP 1 Location: Left arm)   Pulse 93   Temp 98.3 °F (36.8 °C) (Oral)   Resp 16   Ht 5' 8\" (1.727 m)   Wt 246 lb 9.6 oz (111.9 kg)   SpO2 98%   BMI 37.50 kg/m²       Physical Exam  Constitutional:       General: He is not in acute distress. Appearance: Normal appearance. He is not ill-appearing. Cardiovascular:      Rate and Rhythm: Normal rate and regular rhythm. Heart sounds: No murmur. Pulmonary:      Effort: Pulmonary effort is normal. No respiratory distress. Breath sounds: Normal breath sounds. No stridor. No wheezing, rhonchi or rales. Neurological:      Mental Status: He is alert and oriented to person, place, and time. ASSESSMENT and PLAN    ICD-10-CM ICD-9-CM    1. Erectile dysfunction, unspecified erectile dysfunction type N52.9 607.84 tadalafil (CIALIS) 20 mg tablet   2. Essential hypertension I10 401.9 lisinopril-hydroCHLOROthiazide (PRINZIDE, ZESTORETIC) 20-25 mg per tablet      LIPID PANEL      METABOLIC PANEL, COMPREHENSIVE      METABOLIC PANEL, COMPREHENSIVE      LIPID PANEL   3. Chronic bilateral low back pain without sciatica M54.5 724.2 REFERRAL TO ORTHOPEDICS    G89.29 338.29      PLAN:  Back:  Pt will stop the Motrin and Flexeril. He will start Anaprox and Norflex bid. Pt ref to ortho. We discussed ED and treatment option. Pt is aware this may not be covered by his insurance. I have discussed the diagnosis with the patient and the intended plan as seen in the above orders.   The patient has received an after-visit summary and questions were answered concerning future plans. I have discussed medication side effects and warnings with the patient as well. Patient will call for further questions. Follow-up and Dispositions    · Return in about 6 months (around 7/20/2020) for chronic care.

## 2020-01-21 LAB
A-G RATIO,AGRAT: 1.6 RATIO (ref 1.1–2.6)
ALBUMIN SERPL-MCNC: 4.7 G/DL (ref 3.5–5)
ALP SERPL-CCNC: 101 U/L (ref 25–115)
ALT SERPL-CCNC: 78 U/L (ref 5–40)
ANION GAP SERPL CALC-SCNC: 14 MMOL/L
AST SERPL W P-5'-P-CCNC: 42 U/L (ref 10–37)
BILIRUB SERPL-MCNC: 0.4 MG/DL (ref 0.2–1.2)
BUN SERPL-MCNC: 11 MG/DL (ref 6–22)
CALCIUM SERPL-MCNC: 9.5 MG/DL (ref 8.4–10.5)
CHLORIDE SERPL-SCNC: 98 MMOL/L (ref 98–110)
CHOLEST SERPL-MCNC: 225 MG/DL (ref 110–200)
CO2 SERPL-SCNC: 26 MMOL/L (ref 20–32)
CREAT SERPL-MCNC: 1.2 MG/DL (ref 0.5–1.2)
GFRAA, 66117: >60
GFRNA, 66118: >60
GLOBULIN,GLOB: 2.9 G/DL (ref 2–4)
GLUCOSE SERPL-MCNC: 185 MG/DL (ref 70–99)
HDLC SERPL-MCNC: 34 MG/DL
HDLC SERPL-MCNC: 6.6 MG/DL (ref 0–5)
LDL/HDL RATIO,LDHD: 4.7
LDLC SERPL CALC-MCNC: 161 MG/DL (ref 50–99)
NON-HDL CHOLESTEROL, 011976: 191 MG/DL
POTASSIUM SERPL-SCNC: 4.1 MMOL/L (ref 3.5–5.5)
PROT SERPL-MCNC: 7.6 G/DL (ref 6.4–8.3)
SODIUM SERPL-SCNC: 138 MMOL/L (ref 133–145)
TRIGL SERPL-MCNC: 152 MG/DL (ref 40–149)
VLDLC SERPL CALC-MCNC: 30 MG/DL (ref 8–30)

## 2020-02-08 DIAGNOSIS — N52.9 ERECTILE DYSFUNCTION, UNSPECIFIED ERECTILE DYSFUNCTION TYPE: ICD-10-CM

## 2020-02-08 RX ORDER — TADALAFIL 20 MG/1
20 TABLET ORAL AS NEEDED
Qty: 9 TAB | Refills: 5 | Status: CANCELLED | OUTPATIENT
Start: 2020-02-08

## 2020-02-10 RX ORDER — CLOTRIMAZOLE AND BETAMETHASONE DIPROPIONATE 10; .64 MG/G; MG/G
CREAM TOPICAL
Qty: 15 G | Refills: 0 | Status: SHIPPED | OUTPATIENT
Start: 2020-02-10

## 2020-02-10 NOTE — TELEPHONE ENCOUNTER
Last Visit: 1/20/20 with JABARI Ray  Next Appointment: 7/20/20 with JABARI Ray  Previous Refill Encounter(s): 1/20/20 #15g    Requested Prescriptions     Pending Prescriptions Disp Refills    clotrimazole-betamethasone (LOTRISONE) topical cream 15 g 0     Sig: Use small amount bid until clear     Patient still has refills at the pharmacy for Cialis. I contacted the pharmacy to confirm and requested they refill it for the patient.

## 2020-05-04 DIAGNOSIS — I10 ESSENTIAL HYPERTENSION: ICD-10-CM

## 2020-05-04 RX ORDER — LISINOPRIL AND HYDROCHLOROTHIAZIDE 20; 25 MG/1; MG/1
1 TABLET ORAL DAILY
Qty: 90 TAB | Refills: 0 | OUTPATIENT
Start: 2020-05-04

## 2020-05-04 RX ORDER — NAPROXEN SODIUM 550 MG/1
550 TABLET ORAL 2 TIMES DAILY WITH MEALS
Qty: 30 TAB | Refills: 0 | Status: SHIPPED | OUTPATIENT
Start: 2020-05-04 | End: 2020-05-26

## 2020-05-04 RX ORDER — ORPHENADRINE CITRATE 100 MG/1
100 TABLET, EXTENDED RELEASE ORAL 2 TIMES DAILY
Qty: 30 TAB | Refills: 0 | Status: SHIPPED | OUTPATIENT
Start: 2020-05-04 | End: 2020-05-26

## 2020-05-04 NOTE — TELEPHONE ENCOUNTER
Last visit 01/20/2020 NP Reyes Quan   Next appointment 07/10/2020 NP Cory   Previous refill encounter(s) 01/20/2020 Zestoretic #90 with 1 refill, 01/20/2020 Norflex #30 with 1 refill, 01/20/2020 Anaprox #30 with 1 refill     Requested Prescriptions     Pending Prescriptions Disp Refills    lisinopril-hydroCHLOROthiazide (PRINZIDE, ZESTORETIC) 20-25 mg per tablet 90 Tab 0     Sig: Take 1 Tab by mouth daily.  orphenadrine citrate (NORFLEX) 100 mg sr tablet 30 Tab 0     Sig: Take 1 Tab by mouth two (2) times a day.  naproxen sodium (ANAPROX) 550 mg tablet 30 Tab 0     Sig: Take 1 Tab by mouth two (2) times daily (with meals).

## 2020-05-26 RX ORDER — ORPHENADRINE CITRATE 100 MG/1
TABLET, EXTENDED RELEASE ORAL
Qty: 30 TAB | Refills: 0 | Status: SHIPPED | OUTPATIENT
Start: 2020-05-26

## 2020-05-26 RX ORDER — NAPROXEN SODIUM 550 MG/1
TABLET ORAL
Qty: 30 TAB | Refills: 0 | Status: SHIPPED | OUTPATIENT
Start: 2020-05-26 | End: 2020-07-12

## 2020-06-15 ENCOUNTER — OFFICE VISIT (OUTPATIENT)
Dept: ORTHOPEDIC SURGERY | Age: 45
End: 2020-06-15

## 2020-06-15 VITALS
BODY MASS INDEX: 37.89 KG/M2 | DIASTOLIC BLOOD PRESSURE: 84 MMHG | WEIGHT: 250 LBS | HEIGHT: 68 IN | SYSTOLIC BLOOD PRESSURE: 167 MMHG | HEART RATE: 90 BPM

## 2020-06-15 DIAGNOSIS — M51.36 DDD (DEGENERATIVE DISC DISEASE), LUMBAR: ICD-10-CM

## 2020-06-15 DIAGNOSIS — E66.01 SEVERE OBESITY (BMI 35.0-39.9) WITH COMORBIDITY (HCC): ICD-10-CM

## 2020-06-15 DIAGNOSIS — G89.29 CHRONIC MIDLINE LOW BACK PAIN WITHOUT SCIATICA: ICD-10-CM

## 2020-06-15 DIAGNOSIS — M62.838 MUSCLE SPASM: ICD-10-CM

## 2020-06-15 DIAGNOSIS — M54.50 CHRONIC MIDLINE LOW BACK PAIN WITHOUT SCIATICA: ICD-10-CM

## 2020-06-15 DIAGNOSIS — M47.816 LUMBAR FACET ARTHROPATHY: Primary | ICD-10-CM

## 2020-06-15 NOTE — LETTER
6/15/20 Patient: Roque Coffey YOB: 1975 Date of Visit: 6/15/2020 Gregorio Barrera NP 
[address] [address] VIA In Basket Dear Gregorio Barrera NP, Thank you for referring Mr. Roque Coffey to [address] [place] [address] for evaluation. My notes for this consultation are attached. If you have questions, please do not hesitate to call me. I look forward to following your patient along with you. Sincerely, Rafa Ortiz MD
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
## 2020-06-15 NOTE — PATIENT INSTRUCTIONS
Low Back Arthritis: Exercises Introduction Here are some examples of typical rehabilitation exercises for your condition. Start each exercise slowly. Ease off the exercise if you start to have pain. Your doctor or physical therapist will tell you when you can start these exercises and which ones will work best for you. When you are not being active, find a comfortable position for rest. Some people are comfortable on the floor or a medium-firm bed with a small pillow under their head and another under their knees. Some people prefer to lie on their side with a pillow between their knees. Don't stay in one position for too long. Take short walks (10 to 20 minutes) every 2 to 3 hours. Avoid slopes, hills, and stairs until you feel better. Walk only distances you can manage without pain, especially leg pain. How to do the exercises Pelvic tilt 1. Lie on your back with your knees bent. 2. \"Brace\" your stomachtighten your muscles by pulling in and imagining your belly button moving toward your spine. 3. Press your lower back into the floor. You should feel your hips and pelvis rock back. 4. Hold for 6 seconds while breathing smoothly. 5. Relax and allow your pelvis and hips to rock forward. 6. Repeat 8 to 12 times. Back stretches 1. Get down on your hands and knees on the floor. 2. Relax your head and allow it to droop. Round your back up toward the ceiling until you feel a nice stretch in your upper, middle, and lower back. Hold this stretch for as long as it feels comfortable, or about 15 to 30 seconds. 3. Return to the starting position with a flat back while you are on your hands and knees. 4. Let your back sway by pressing your stomach toward the floor. Lift your buttocks toward the ceiling. 5. Hold this position for 15 to 30 seconds. 6. Repeat 2 to 4 times. Follow-up care is a key part of your treatment and safety.  Be sure to make and go to all appointments, and call your doctor if you are having problems. It's also a good idea to know your test results and keep a list of the medicines you take. Where can you learn more? Go to http://deon-darshan.info/ Enter I876 in the search box to learn more about \"Low Back Arthritis: Exercises. \" Current as of: March 2, 2020               Content Version: 12.5 © 2006-2020 [employer]. Care instructions adapted under license by [employer] (which disclaims liability or warranty for this information). If you have questions about a medical condition or this instruction, always ask your healthcare professional. [address] any warranty or liability for your use of this information.

## 2020-06-18 DIAGNOSIS — N52.9 ERECTILE DYSFUNCTION, UNSPECIFIED ERECTILE DYSFUNCTION TYPE: ICD-10-CM

## 2020-06-18 DIAGNOSIS — A60.00 GENITAL HERPES: ICD-10-CM

## 2020-06-18 RX ORDER — TADALAFIL 20 MG/1
20 TABLET ORAL AS NEEDED
Qty: 9 TAB | Refills: 5 | Status: CANCELLED | OUTPATIENT
Start: 2020-06-18

## 2020-06-18 RX ORDER — VALACYCLOVIR HYDROCHLORIDE 1 G/1
TABLET, FILM COATED ORAL
Qty: 30 TAB | Refills: 3 | Status: SHIPPED | OUTPATIENT
Start: 2020-06-18

## 2020-06-18 NOTE — TELEPHONE ENCOUNTER
Patient still has refills at the pharmacy for Cialis. I contacted the pharmacy to confirm and requested they refill it for the patient. No further action needed.

## 2020-06-18 NOTE — TELEPHONE ENCOUNTER
Last Visit: 1/20/20 with JABARI Ray  Next Appointment: 7/20/20 with JABARI Ray  Previous Refill Encounter(s): 6/25/19 #30 with 3 refills    Requested Prescriptions     Pending Prescriptions Disp Refills    valACYclovir (VALTREX) 1 gram tablet 30 Tab 3     Sig: Take tid x7 days during outbreak.

## 2020-07-12 RX ORDER — NAPROXEN SODIUM 550 MG/1
TABLET ORAL
Qty: 30 TAB | Refills: 0 | Status: SHIPPED | OUTPATIENT
Start: 2020-07-12 | End: 2020-08-19

## 2020-07-17 ENCOUNTER — HOSPITAL ENCOUNTER (OUTPATIENT)
Dept: PHYSICAL THERAPY | Age: 45
Discharge: HOME | End: 2020-07-17
Payer: COMMERCIAL

## 2020-07-17 PROCEDURE — 97162 PT EVAL MOD COMPLEX 30 MIN: CPT

## 2020-07-17 PROCEDURE — 97535 SELF CARE MNGMENT TRAINING: CPT

## 2020-07-17 PROCEDURE — 97110 THERAPEUTIC EXERCISES: CPT

## 2020-07-17 NOTE — PROGRESS NOTES
PT DAILY TREATMENT NOTE 10-18    Patient Name: Domonique Beltrán  Date:2020  : 1975  [x]  Patient  Verified  Payor: Jay Artist / Plan: [employer] / Product Type: HMO /    In time:1015  Out time:1056  Total Treatment Time (min): 41  Visit #: 1 of 4-8    Treatment Area: Low back pain [M54.5]    SUBJECTIVE  Pain Level (0-10 scale): 0  Any medication changes, allergies to medications, adverse drug reactions, diagnosis change, or new procedure performed?: [x] No    [] Yes (see summary sheet for update)  Subjective functional status/changes:   [] No changes reported  No pain at rest, pain with prolonged amb and standing. OBJECTIVE    15 min [x]Eval                  []Re-Eval       15 min Therapeutic Exercise:  HEP instruction and performance   Rationale: increase ROM and increase strength to improve the patients ability to ambulate with reduced pain    11 min Self Care/Home Management:  Educated pt about relevant anatomy and pathology as well as goals for POC and importance of regular HEP compliance to achieve success. Rationale: increase ROM  to improve the patients ability to ambulate with reduced pain           With   [x] TE   [] TA   [] neuro   [] other: Patient Education: [x] Review HEP    [] Progressed/Changed HEP based on:   [] positioning   [] body mechanics   [] transfers   [] heat/ice application    [] other:      Other Objective/Functional Measures: see paper chart     Pain Level (0-10 scale) post treatment: 0    ASSESSMENT/Changes in Function: Pt is a 58-year-old male presenting to the clinic with c/o LBP with prolonged walking and standing. Pt has some posterior thigh shooting pains with occasional burning/tingling after walking for 20 minutes or longer. Pt denies any pain or LE symptoms at rest. Repeated movements into flexion and extension do not provoke symptoms. Some neural tension of the sciatic nerve is elicited with B slump testing. SLR and SIJ cluster testing is negative bilaterally. Pt denies any muscular cramping pains with ambulation. Present impairments are inclusive of decreased lumbar ROM in all directions, decreased flexibility, and decreased B LE strength. Pt would benefit from Aquatic Therapy to improve present impairments followed by progression to land therapy to further work on abdominal strengthening for improved stability. Pt provided with an HEP focusing on stretching. Patient will benefit from skilled PT services to modify and progress therapeutic interventions, address functional mobility deficits, address ROM deficits, address strength deficits, analyze and address soft tissue restrictions, analyze and cue movement patterns, analyze and modify body mechanics/ergonomics and assess and modify postural abnormalities to attain remaining goals. [x]  See Plan of Care  []  See progress note/recertification  []  See Discharge Summary         Progress towards goals / Updated goals:  Short Term Goals: To be accomplished in 2 weeks:  1. Pt will be I and compliant with HEP to maximize therapeutic success. Eval: HEP assigned  2. Pt will indicate no increase in LBP following therapy to optimize goal progression. Eval: 0/10  Long Term Goals: To be accomplished in 4 weeks:  1. Pt will progress to land therapy and complete session without increase in LBP. Eval: not observed  2. Pt will be I and compliant with an aquatic HEP to be performed at his apartment complex to maximize therapeutic success. Eval: aquatic PT with land HEP  3. Pt will report walking for 20 minutes with no more than 3/10 low back pain to improve ease of ADLs. Eval: 8/10 with 20 minutes walking  4. Pt will improve lumbar ROM in all planes to greater than 75% to improve ease of ADLs. Eval: 50-75% in all planes  5. Pt will improve B hip and knee strength to 4+/5 or better to improve ease of ambulation.    Eval: B hip flexion 4/5, extension 4+/5, knee extension/flexion 4/5    PLAN  []  Upgrade activities as tolerated     [x]  Continue plan of care  []  Update interventions per flow sheet       []  Discharge due to:_  []  Other:_      Rochelle Alonzo, PT 7/17/2020  11:02 AM    Future Appointments   Date Time Provider Shweta Allison   7/20/2020  3:00 PM Marcelino Ray, JABARI [facility]   7/22/2020  5:45 PM Al Lowery, PTA [facility]   7/28/2020  1:15 PM Johnnie Ghosh  [facility]   7/31/2020  1:15 PM Angela Carranza, PT [facility]   8/7/2020  3:45 PM Angela Carranza, PT [facility]   8/14/2020  4:30 PM Angela Carranza, PT [facility]

## 2020-07-17 NOTE — PROGRESS NOTES
[facility]  [address]  [place], [address]  [phone] fax    Plan of Care/ Statement of Necessity for Physical Therapy Services    Patient name: Jonah Chase Start of Care: 2020   Referral source: Teresa Espinoza MD : 1975    Medical Diagnosis: Low back pain [M54.5]  Payor: Ruben Mccurdy / Plan: Yomi [place] / Product Type: HMO /  Onset Date:2 years, progressively worsening    Treatment Diagnosis: LBP   Prior Hospitalization: see medical history Provider#: [id number]   Medications: Verified on Patient summary List    Comorbidities: back pain, HBP   Prior Level of Function: I with ADLs     The Plan of Care and following information is based on the information from the initial evaluation. Assessment/ key information: Pt is a 43-year-old male presenting to the clinic with c/o LBP with prolonged walking and standing. Pt has some posterior thigh shooting pains with occasional burning/tingling after walking for 20 minutes or longer. Pt denies any pain or LE symptoms at rest. Repeated movements into flexion and extension do not provoke symptoms. Some neural tension of the sciatic nerve is elicited with B slump testing. SLR and SIJ cluster testing is negative bilaterally. Pt denies any muscular cramping pains with ambulation. Present impairments are inclusive of decreased lumbar ROM in all directions, decreased flexibility, and decreased B LE strength. Pt would benefit from Aquatic Therapy to improve present impairments followed by progression to land therapy to further work on abdominal strengthening for improved stability. Pt provided with an HEP focusing on stretching.  Patient will benefit from skilled PT services to modify and progress therapeutic interventions, address functional mobility deficits, address ROM deficits, address strength deficits, analyze and address soft tissue restrictions, analyze and cue movement patterns, analyze and modify body mechanics/ergonomics and assess and modify postural abnormalities to attain remaining goals. Evaluation Complexity History MEDIUM  Complexity : 1-2 comorbidities / personal factors will impact the outcome/ POC ; Examination MEDIUM Complexity : 3 Standardized tests and measures addressing body structure, function, activity limitation and / or participation in recreation  ;Presentation MEDIUM Complexity : Evolving with changing characteristics  ; Clinical Decision Making MEDIUM Complexity : FOTO score of 26-74  Overall Complexity Rating: MEDIUM  Problem List: pain affecting function, decrease ROM, decrease strength, impaired gait/ balance, decrease ADL/ functional abilitiies, decrease activity tolerance and decrease flexibility/ joint mobility   Treatment Plan may include any combination of the following: Therapeutic exercise, Therapeutic activities, Neuromuscular re-education, Physical agent/modality, Gait/balance training, Manual therapy, Aquatic therapy, Patient education, Self Care training and Functional mobility training  Patient / Family readiness to learn indicated by: asking questions, trying to perform skills and interest  Persons(s) to be included in education: patient (P)  Barriers to Learning/Limitations: None  Patient Goal (s): end back pain, better mobility  Patient Self Reported Health Status: fair  Rehabilitation Potential: good    Short Term Goals: To be accomplished in 2 weeks:  1. Pt will be I and compliant with HEP to maximize therapeutic success. 2. Pt will indicate no increase in LBP following therapy to optimize goal progression. Long Term Goals: To be accomplished in 4 weeks:  1. Pt will progress to land therapy and complete session without increase in LBP. 2. Pt will be I and compliant with an aquatic HEP to be performed at his apartment complex to maximize therapeutic success.   3. Pt will report walking for 20 minutes with no more than 3/10 low back pain to improve ease of ADLs.  4. Pt will improve lumbar ROM in all planes to greater than 75% to improve ease of ADLs. 5. Pt will improve B hip strength to 4+/5 or better to improve ease of ambulation. Frequency / Duration: Patient to be seen 1-2 times per week for 4 weeks. Patient/ Caregiver education and instruction: Diagnosis, prognosis, self care, activity modification and exercises   [x]  Plan of care has been reviewed with LETY aJcinto, PT 7/17/2020 11:01 AM    ________________________________________________________________________    I certify that the above Therapy Services are being furnished while the patient is under my care. I agree with the treatment plan and certify that this therapy is necessary.     Physician's Signature:____________Date:_________TIME:________    ** Signature, Date and Time must be completed for valid certification **    Please sign and return to [facility] [address]  [address]  [place], [facility] [address] [facility] [address]  [phone] fax

## 2020-07-20 ENCOUNTER — VIRTUAL VISIT (OUTPATIENT)
Dept: FAMILY MEDICINE CLINIC | Age: 45
End: 2020-07-20

## 2020-07-20 DIAGNOSIS — N52.9 ERECTILE DYSFUNCTION, UNSPECIFIED ERECTILE DYSFUNCTION TYPE: ICD-10-CM

## 2020-07-20 DIAGNOSIS — I10 ESSENTIAL HYPERTENSION: ICD-10-CM

## 2020-07-20 DIAGNOSIS — L30.9 DERMATITIS: ICD-10-CM

## 2020-07-20 RX ORDER — LISINOPRIL AND HYDROCHLOROTHIAZIDE 20; 25 MG/1; MG/1
1 TABLET ORAL DAILY
Qty: 90 TAB | Refills: 1 | Status: SHIPPED | OUTPATIENT
Start: 2020-07-20

## 2020-07-20 RX ORDER — METOPROLOL SUCCINATE 50 MG/1
100 TABLET, EXTENDED RELEASE ORAL DAILY
Qty: 90 TAB | Refills: 1 | Status: SHIPPED | OUTPATIENT
Start: 2020-07-20 | End: 2021-01-13

## 2020-07-20 RX ORDER — CLOBETASOL PROPIONATE 0.46 MG/ML
SOLUTION TOPICAL
Qty: 50 ML | Refills: 5 | Status: SHIPPED | OUTPATIENT
Start: 2020-07-20

## 2020-07-20 RX ORDER — TADALAFIL 20 MG/1
20 TABLET ORAL AS NEEDED
Qty: 9 TAB | Refills: 5 | Status: SHIPPED | OUTPATIENT
Start: 2020-07-20 | End: 2020-10-10 | Stop reason: SDUPTHER

## 2020-07-22 ENCOUNTER — HOSPITAL ENCOUNTER (OUTPATIENT)
Dept: PHYSICAL THERAPY | Age: 45
End: 2020-07-22
Payer: COMMERCIAL

## 2020-07-28 ENCOUNTER — OFFICE VISIT (OUTPATIENT)
Dept: ORTHOPEDIC SURGERY | Age: 45
End: 2020-07-28

## 2020-07-28 VITALS
HEIGHT: 68 IN | OXYGEN SATURATION: 97 % | HEART RATE: 82 BPM | DIASTOLIC BLOOD PRESSURE: 74 MMHG | WEIGHT: 250 LBS | BODY MASS INDEX: 37.89 KG/M2 | SYSTOLIC BLOOD PRESSURE: 146 MMHG | TEMPERATURE: 95.6 F

## 2020-07-28 DIAGNOSIS — M47.816 LUMBAR FACET ARTHROPATHY: ICD-10-CM

## 2020-07-28 DIAGNOSIS — E66.01 SEVERE OBESITY (BMI 35.0-39.9) WITH COMORBIDITY (HCC): ICD-10-CM

## 2020-07-28 DIAGNOSIS — F40.240 CLAUSTROPHOBIA: ICD-10-CM

## 2020-07-28 DIAGNOSIS — M54.16 BILATERAL LUMBAR RADICULOPATHY: Primary | ICD-10-CM

## 2020-07-28 DIAGNOSIS — M62.838 MUSCLE SPASM: ICD-10-CM

## 2020-07-28 RX ORDER — DIAZEPAM 5 MG/1
TABLET ORAL
Qty: 2 TAB | Refills: 0 | Status: SHIPPED | OUTPATIENT
Start: 2020-07-28
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
## 2020-07-28 NOTE — LETTER
7/29/20 Patient: Meryle Hoof YOB: 1975 Date of Visit: 7/28/2020 Senaida Kawasaki, NP 
[facility] [address] [place] [id number] VIA In Basket Dear Senaida Kawasaki, NP, Thank you for referring Mr. Meryle Hoof to [address] [place] [address] for evaluation. My notes for this consultation are attached. If you have questions, please do not hesitate to call me. I look forward to following your patient along with you. Sincerely, Hunter Chase MD

## 2020-07-28 NOTE — PATIENT INSTRUCTIONS
Low Back Arthritis: Exercises Introduction Here are some examples of typical rehabilitation exercises for your condition. Start each exercise slowly. Ease off the exercise if you start to have pain. Your doctor or physical therapist will tell you when you can start these exercises and which ones will work best for you. When you are not being active, find a comfortable position for rest. Some people are comfortable on the floor or a medium-firm bed with a small pillow under their head and another under their knees. Some people prefer to lie on their side with a pillow between their knees. Don't stay in one position for too long. Take short walks (10 to 20 minutes) every 2 to 3 hours. Avoid slopes, hills, and stairs until you feel better. Walk only distances you can manage without pain, especially leg pain. How to do the exercises Pelvic tilt 1. Lie on your back with your knees bent. 2. \"Brace\" your stomachtighten your muscles by pulling in and imagining your belly button moving toward your spine. 3. Press your lower back into the floor. You should feel your hips and pelvis rock back. 4. Hold for 6 seconds while breathing smoothly. 5. Relax and allow your pelvis and hips to rock forward. 6. Repeat 8 to 12 times. Back stretches 1. Get down on your hands and knees on the floor. 2. Relax your head and allow it to droop. Round your back up toward the ceiling until you feel a nice stretch in your upper, middle, and lower back. Hold this stretch for as long as it feels comfortable, or about 15 to 30 seconds. 3. Return to the starting position with a flat back while you are on your hands and knees. 4. Let your back sway by pressing your stomach toward the floor. Lift your buttocks toward the ceiling. 5. Hold this position for 15 to 30 seconds. 6. Repeat 2 to 4 times. Follow-up care is a key part of your treatment and safety.  Be sure to make and go to all appointments, and call your doctor if you are having problems. It's also a good idea to know your test results and keep a list of the medicines you take. Where can you learn more? Go to http://www.gray.com/ Enter J984 in the search box to learn more about \"Low Back Arthritis: Exercises. \" Current as of: March 2, 2020               Content Version: 12.5 © 2006-2020 [employer]. Care instructions adapted under license by [employer] (which disclaims liability or warranty for this information). If you have questions about a medical condition or this instruction, always ask your healthcare professional. [address] any warranty or liability for your use of this information.

## 2020-07-31 ENCOUNTER — HOSPITAL ENCOUNTER (OUTPATIENT)
Dept: PHYSICAL THERAPY | Age: 45
Discharge: HOME | End: 2020-07-31
Payer: COMMERCIAL

## 2020-07-31 PROCEDURE — 97113 AQUATIC THERAPY/EXERCISES: CPT

## 2020-07-31 NOTE — PROGRESS NOTES
PT DAILY TREATMENT NOTE 10-18    Patient Name: Domonique Beltrán  Date:2020  : 1975  [x]  Patient  Verified  Payor: Jay Saldañaist / Plan: Aniya [employer] [place] HMO / Product Type: HMO /    In time:114  Out time:200  Total Treatment Time (min): 46  Visit #: 2 of 4-8    Treatment Area: Low back pain [M54.5]    SUBJECTIVE  Pain Level (0-10 scale): 0  Any medication changes, allergies to medications, adverse drug reactions, diagnosis change, or new procedure performed?: [x] No    [] Yes (see summary sheet for update)  Subjective functional status/changes:   [] No changes reported  Pt reports he has been doing daily stretching but denies any pain since SOC secondary to not needing to walk for longer than a few minutes at a time. Pain elicited with longer walks, which he has not tried yet. OBJECTIVE    46 min Aquatic Therapy:  [x] See flow sheet : exercises initiated per flowsheet   Rationale: increase ROM and increase strength to improve the patients ability to tolerate ambulation with reduced pain. With   [] TE   [] TA   [] neuro   [] other: Patient Education: [x] Review HEP    [] Progressed/Changed HEP based on:   [] positioning   [] body mechanics   [] transfers   [] heat/ice application    [] other:      Other Objective/Functional Measures: no pain increase     Pain Level (0-10 scale) post treatment: 0    ASSESSMENT/Changes in Function: Pt performs all exercises as directed, requiring verbal cueing for correct performance of new exercises. Advised pt to walk for at least one day prior to his next visit in order to assess if his symptoms are improving. Educated pt about DOMS. Will progress next session with ankle weights.     Patient will continue to benefit from skilled PT services to modify and progress therapeutic interventions, address functional mobility deficits, address ROM deficits, address strength deficits, analyze and address soft tissue restrictions, analyze and cue movement patterns, analyze and modify body mechanics/ergonomics, assess and modify postural abnormalities and instruct in home and community integration to attain remaining goals. [x]  See Plan of Care  []  See progress note/recertification  []  See Discharge Summary         Progress towards goals / Updated goals:  Short Term Goals: To be accomplished in 2 weeks:  1. Pt will be I and compliant with HEP to maximize therapeutic success. Eval: HEP assigned   Current: met, reports daily compliance (7/31/2020)  2. Pt will indicate no increase in LBP following therapy to optimize goal progression. Eval: 0/10   Current: met, no pain increase (7/31/2020)  Long Term Goals: To be accomplished in 4 weeks:  1. Pt will progress to land therapy and complete session without increase in LBP. Eval: not observed  2. Pt will be I and compliant with an aquatic HEP to be performed at his apartment complex to maximize therapeutic success. Eval: aquatic PT with land HEP  3. Pt will report walking for 20 minutes with no more than 3/10 low back pain to improve ease of ADLs. Eval: 8/10 with 20 minutes walking  4. Pt will improve lumbar ROM in all planes to greater than 75% to improve ease of ADLs. Eval: 50-75% in all planes  5. Pt will improve B hip and knee strength to 4+/5 or better to improve ease of ambulation.               Eval: B hip flexion 4/5, extension 4+/5, knee extension/flexion 4/5    PLAN  [x]  Upgrade activities as tolerated     [x]  Continue plan of care  []  Update interventions per flow sheet       []  Discharge due to:_  []  Other:_      Tamara Robles, PT 7/31/2020  1:40 PM    Future Appointments   Date Time Provider Shweta Allison   8/7/2020  3:45 PM Genevieve Osorio, PT [facility]   8/14/2020  4:30 PM Josefina Moore, PTA [facility]   9/9/2020 11:15 AM Radha Vallejo  [address]

## 2020-08-07 ENCOUNTER — HOSPITAL ENCOUNTER (OUTPATIENT)
Dept: PHYSICAL THERAPY | Age: 45
Discharge: HOME | End: 2020-08-07
Payer: COMMERCIAL

## 2020-08-07 PROCEDURE — 97113 AQUATIC THERAPY/EXERCISES: CPT

## 2020-08-07 NOTE — PROGRESS NOTES
PT DAILY TREATMENT NOTE 10-18    Patient Name: Flakita Lind  Date:2020  : 1975  [x]  Patient  Verified  Payor: Mai Marinelli / Plan: Aniya [employer] [place] HMO / Product Type: HMO /    In time:350  Out time:430  Total Treatment Time (min): 40  Visit #: 3 of 4-8    Treatment Area: Low back pain [M54.5]    SUBJECTIVE  Pain Level (0-10 scale): 0  Any medication changes, allergies to medications, adverse drug reactions, diagnosis change, or new procedure performed?: [x] No    [] Yes (see summary sheet for update)  Subjective functional status/changes:   [] No changes reported  Pt reports some pain onset of 4-5/10 with housework (bending and straightening) and with 10 minute walk. OBJECTIVE    40 min Aquatic Therapy:  [x] See flow sheet : exercises performed per flowsheet   Rationale: increase ROM and increase strength to improve the patients ability to tolerate ADLs. With   [] TE   [] TA   [] neuro   [] other: Patient Education: [x] Review HEP    [] Progressed/Changed HEP based on:   [] positioning   [] body mechanics   [] transfers   [] heat/ice application    [] other:      Other Objective/Functional Measures: no pain increase     Pain Level (0-10 scale) post treatment: 0    ASSESSMENT/Changes in Function: No pain increase with exercises today. Discussed transition to land for next visit with plan to work on core stability, stretching, squats, and LE strengthening. Patient will continue to benefit from skilled PT services to modify and progress therapeutic interventions, address functional mobility deficits, address ROM deficits, address strength deficits, analyze and address soft tissue restrictions, analyze and cue movement patterns, analyze and modify body mechanics/ergonomics, assess and modify postural abnormalities and instruct in home and community integration to attain remaining goals.      [x]  See Plan of Care  []  See progress note/recertification  []  See Discharge Summary         Progress towards goals / Updated goals:  Short Term Goals: To be accomplished in 2 weeks:  1. Pt will be I and compliant with HEP to maximize therapeutic success.              Eval: HEP assigned              Current: met, reports daily compliance (7/31/2020)  2. Pt will indicate no increase in LBP following therapy to optimize goal progression.              Eval: 0/10              Current: met, no pain increase (7/31/2020)  Long Term Goals: To be accomplished in 4 weeks:  1. Pt will progress to land therapy and complete session without increase in LBP.             Eval: not observed  2. Pt will be I and compliant with an aquatic HEP to be performed at his apartment complex to maximize therapeutic success.              Eval: aquatic PT with land HEP   Current: met, doesn't have pool access so continuing with land HEP (8/7/2020)  3. Pt will report walking for 20 minutes with no more than 3/10 low back pain to improve ease of ADLs.             Eval: 8/10 with 20 minutes walking   Current: progressing, 4-5/10 back pain with squatting and bending activities at home and with brief 10 minute walk (8/7/2020)  4. Pt will improve lumbar ROM in all planes to greater than 75% to improve ease of ADLs.             Eval: 50-75% in all planes  5.  Pt will improve B hip and knee strength to 4+/5 or better to improve ease of ambulation.              Eval: B hip flexion 4/5, extension 4+/5, knee extension/flexion 4/5    PLAN  []  Upgrade activities as tolerated     [x]  Continue plan of care  []  Update interventions per flow sheet       []  Discharge due to:_  []  Other:_      Guillaume Delgado, PT 8/7/2020  4:07 PM    Future Appointments   Date Time Provider Shweta Allison   8/14/2020  4:30 PM Roberth Skaggs PTA [facility]   8/17/2020  7:30 PM [facility] RM 1 [facility]   9/9/2020 11:15 AM Sujata Lutz  [address]

## 2020-08-15 DIAGNOSIS — N52.9 ERECTILE DYSFUNCTION, UNSPECIFIED ERECTILE DYSFUNCTION TYPE: ICD-10-CM

## 2020-08-15 RX ORDER — TADALAFIL 20 MG/1
20 TABLET ORAL AS NEEDED
Qty: 9 TAB | Refills: 5 | Status: CANCELLED | OUTPATIENT
Start: 2020-08-15

## 2020-08-17 ENCOUNTER — HOSPITAL ENCOUNTER (OUTPATIENT)
Age: 45
Discharge: HOME | End: 2020-08-17
Attending: PHYSICAL MEDICINE & REHABILITATION
Payer: COMMERCIAL

## 2020-08-17 DIAGNOSIS — M62.838 MUSCLE SPASM: ICD-10-CM

## 2020-08-17 DIAGNOSIS — M54.16 BILATERAL LUMBAR RADICULOPATHY: ICD-10-CM

## 2020-08-17 DIAGNOSIS — M47.816 LUMBAR FACET ARTHROPATHY: ICD-10-CM

## 2020-08-17 PROCEDURE — 72148 MRI LUMBAR SPINE W/O DYE: CPT

## 2020-08-19 RX ORDER — NAPROXEN SODIUM 550 MG/1
TABLET ORAL
Qty: 30 TAB | Refills: 0 | Status: SHIPPED | OUTPATIENT
Start: 2020-08-19

## 2020-08-21 ENCOUNTER — APPOINTMENT (OUTPATIENT)
Dept: PHYSICAL THERAPY | Age: 45
End: 2020-08-21
Payer: COMMERCIAL

## 2020-08-28 ENCOUNTER — HOSPITAL ENCOUNTER (OUTPATIENT)
Dept: PHYSICAL THERAPY | Age: 45
Discharge: HOME | End: 2020-08-28
Payer: COMMERCIAL

## 2020-08-28 PROCEDURE — 97112 NEUROMUSCULAR REEDUCATION: CPT

## 2020-08-28 PROCEDURE — 97110 THERAPEUTIC EXERCISES: CPT

## 2020-08-28 NOTE — PROGRESS NOTES
PT DISCHARGE DAILY NOTE AND [id number]    Patient name: Will Chavira Start of Care: 2020   Referral source: Shazia Patrick MD : 1975   Medical/Treatment Diagnosis: Low back pain [M54.5] Onset Date:2 years progressively worsening     Prior Hospitalization: see medical history Provider#: [id number]   Medications: Verified on Patient Summary List    Comorbidities: back pain, high blood pressure  Prior Level of Function:independent ADLs, went to gym, no pain    Visits from Start of Care: 4    Missed Visits: 1    Reporting Period : 2020 to 2020    Date:2020  : 1975  [x]  Patient  Verified  Payor: finalsite / Plan: [employer] [facility] HMO / Product Type: HMO /    In time:430  Out time:515  Total Treatment Time (min): 45  Visit #: 1 of 1    SUBJECTIVE  Pain Level (0-10 scale): 0  Any medication changes, allergies to medications, adverse drug reactions, diagnosis change, or new procedure performed?: [x] No    [] Yes (see summary sheet for update)  Subjective functional status/changes:   [] No changes reported  Some improvement in overall pain. Will still get LBP with increased ADL activity. Has not walked for more than 10 minutes at a time. 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 OBJECTIVE    30 min Therapeutic Exercise:  [x] See flow sheet : exercises initiated per flowsheet   Rationale: increase ROM and increase strength to improve the patients ability to perform pain free ADLs. 15 min Neuromuscular Re-education:  [x]  See flow sheet : PPT TA exercises and maura series performed   Rationale: increase strength, improve coordination and improve balance  to improve the patients ability to stabilize with abdominals to reduce LBP with ADLs.           With   [x] TE   [] TA   [] neuro   [] other: Patient Education: [x] Review HEP    [] Progressed/Changed HEP based on:   [] positioning   [] body mechanics   [] transfers   [] heat/ice application    [] other: Other Objective/Functional Measures: B hip flexion/abd/add 4+/5, hip extension 4/5, knee flexion/extension 4/5      Pain Level (0-10 scale) post treatment: 0    Summary of Care:  Short Term Goals: To be accomplished in 2 weeks:   1. Pt will be I and compliant with HEP to maximize therapeutic success. Eval: HEP assigned    Current: met, reports daily compliance (7/31/2020)   2. Pt will indicate no increase in LBP following therapy to optimize goal progression. Eval: 0/10    Current: met, no pain increase (7/31/2020)   Long Term Goals: To be accomplished in 4 weeks:   1. Pt will progress to land therapy and complete session without increase in LBP. Eval: not observed    Current: met, no pain increase (8/28/2020)  2. Pt will be I and compliant with an aquatic HEP to be performed at his apartment complex to maximize therapeutic success. Eval: aquatic PT with land HEP    Current: met, doesn't have pool access so continuing with land HEP (8/7/2020)   3. Pt will report walking for 20 minutes with no more than 3/10 low back pain to improve ease of ADLs. Eval: 8/10 with 20 minutes walking    Current: not met/progressing, able to be on feet for about 20 minutes before back pain increases (8/28/2020)   4. Pt will improve lumbar ROM in all planes to greater than 75% to improve ease of ADLs. Eval: 50-75% in all planes     Current: met, 75% all planes without pain (8/28/2020)  5. Pt will improve B hip and knee strength to 4+/5 or better to improve ease of ambulation. Eval: B hip flexion 4/5, extension 4+/5, knee extension/flexion 4/5   Current: not met/progressing, B hip flexion/abd/add 4+/5, hip extension 4/5, knee flexion/extension 4/5 (8/28/2020)    ASSESSMENT/Changes in Function: Pt has met or is progressing towards all strength, flexibility, and functional goals. Strength progress has been limited secondary to pt attendance 1x/week.  Pt provided with an updated HEP with stretches and mobility exercises to be performed daily and strengthening exercises for the abdominals and hips to be performed 3x/week.     Thank you for this referral!      PLAN  [x]Discontinue therapy: [x]Patient has reached or is progressing toward set goals      []Patient is non-compliant or has abdicated      []Due to lack of appreciable progress towards set goals    Catrachito Camara, PT 8/28/2020  4:39 PM

## 2020-08-28 NOTE — PROGRESS NOTES
[facility]  [address]  [place], [address]  [phone] fax    Physical Therapy Progress Note  Patient name: Will Chavira Start of Care: 2020   Referral source: Shazia Patrick MD : 1975   Medical/Treatment Diagnosis: Low back pain [M54.5]  Payor: Caitlyn Stable / Plan: [employer] / Product Type: HMO /  Onset Date:2 years, progressively worsening     Prior Hospitalization: see medical history Provider#: [id number]   Medications: Verified on Patient Summary List    Comorbidities: back pain, HBP  Prior Level of Function:Ind With ADLs  Visits from Start of Care: 4    Missed Visits: 1      Established Goals:        Excellent         Good         Limited            None  [x] Increased ROM   []  [x]  []  []  [x] Increased Strength  []  [x]  []  []  [x] Increased Mobility  []  [x]  []  []   [x] Decreased Pain   []  []  []  []  [] Decreased Swelling  []  []  []  []    Updated Goals: to be achieved in 1-2 weeks:  Short Term Goals: To be accomplished in 2 weeks:  1. Pt will be I and compliant with HEP to maximize therapeutic success.              Eval: HEP assigned              Current: met, reports daily compliance   2. Pt will indicate no increase in LBP following therapy to optimize goal progression.              Eval: 0/10              Current: met, no pain increase   Long Term Goals: To be accomplished in 4 weeks:  1. Pt will progress to land therapy and complete session without increase in LBP.             Eval: not observed   Current: progress at visit 4.  2. Pt will be I and compliant with an aquatic HEP to be performed at his apartment complex to maximize therapeutic success.              Eval: aquatic PT with land HEP              Current: met, doesn't have pool access so continuing with land HEP   3. Pt will report walking for 20 minutes with no more than 3/10 low back pain to improve ease of ADLs.               Eval: 8/10 with 20 minutes walking              Current: progressing, 4-5/10 back pain with squatting and bending activities at home and with brief 10 minute walk   4. Pt will improve lumbar ROM in all planes to greater than 75% to improve ease of ADLs.             Eval: 50-75% in all planes   Current: re-assess and visit 4.  5. Pt will improve B hip and knee strength to 4+/5 or better to improve ease of ambulation.              Eval: B hip flexion 4/5, extension 4+/5, knee extension/flexion 4/5   Current: re-assess and visit 4.     ASSESSMENT/RECOMMENDATIONS:  Pt missed one visit. Will benefit from continued PT to improve strength, flexibility, and spinal mobility. Will provide progressed HEP at next visit. [x]Continue therapy per initial plan/protocol at a frequency of  1 x per week for 1-2 weeks  []Continue therapy with the following recommended changes:_____________________      _____________________________________________________________________  []Discontinue therapy progressing towards or have reached established goals  []Discontinue therapy due to lack of appreciable progress towards goals  []Discontinue therapy due to lack of attendance or compliance  []Await Physician's recommendations/decisions regarding therapy  []Other:________________________________________________________________    Thank you for this referral.    Henry Lopez, PT 8/28/2020 4:50 PM  NOTE TO PHYSICIAN:  PLEASE COMPLETE THE ORDERS BELOW AND   FAX TO [facility]: [phone]  If you are unable to process this request in 24 hours please contact our office: [phone]    ? I have read the above report and request that my patient continue as recommended. ? I have read the above report and request that my patient continue therapy with the following changes/special instructions:__________________________________________________________  ? I have read the above report and request that my patient be discharged from therapy.     Meche Friend signature: ______________________________Date: ______Time:______

## 2020-10-10 DIAGNOSIS — N52.9 ERECTILE DYSFUNCTION, UNSPECIFIED ERECTILE DYSFUNCTION TYPE: ICD-10-CM

## 2020-10-12 RX ORDER — TADALAFIL 20 MG/1
20 TABLET ORAL AS NEEDED
Qty: 9 TAB | Refills: 5 | Status: SHIPPED | OUTPATIENT
Start: 2020-10-12

## 2020-10-12 NOTE — TELEPHONE ENCOUNTER
Last Visit: 07/20/2020 with JABARI Ray  Next Appointment: noted to f/u in 6 months  Previous Refill Encounter(s): 07/20/2020 per MD Ray #[id number]    Requested Prescriptions     Pending Prescriptions Disp Refills    tadalafiL (Cialis) 20 mg tablet 9 Tab 5     Sig: Take 1 Tab by mouth as needed for Erectile Dysfunction.

## 2020-10-26 ENCOUNTER — OFFICE VISIT (OUTPATIENT)
Dept: ORTHOPEDIC SURGERY | Age: 45
End: 2020-10-26
Payer: COMMERCIAL

## 2020-10-26 VITALS
HEART RATE: 104 BPM | RESPIRATION RATE: 14 BRPM | WEIGHT: 227 LBS | DIASTOLIC BLOOD PRESSURE: 76 MMHG | HEIGHT: 68 IN | BODY MASS INDEX: 34.4 KG/M2 | SYSTOLIC BLOOD PRESSURE: 112 MMHG | TEMPERATURE: 96.9 F | OXYGEN SATURATION: 96 %

## 2020-10-26 DIAGNOSIS — M62.838 MUSCLE SPASM: ICD-10-CM

## 2020-10-26 DIAGNOSIS — E66.01 SEVERE OBESITY (BMI 35.0-39.9) WITH COMORBIDITY (HCC): ICD-10-CM

## 2020-10-26 DIAGNOSIS — M51.26 HNP (HERNIATED NUCLEUS PULPOSUS), LUMBAR: ICD-10-CM

## 2020-10-26 DIAGNOSIS — M54.16 BILATERAL LUMBAR RADICULOPATHY: Primary | ICD-10-CM

## 2020-10-26 DIAGNOSIS — M47.816 LUMBAR FACET ARTHROPATHY: ICD-10-CM

## 2020-10-26 PROCEDURE — 99213 OFFICE O/P EST LOW 20 MIN: CPT | Performed by: PHYSICAL MEDICINE & REHABILITATION

## 2020-10-26 NOTE — PROGRESS NOTES
[facility]  [address] [place], [address]  Phone: [phone]  Fax: [phone]    Pt's YOB: 1975    ASSESSMENT   Diagnoses and all orders for this visit:    1. Bilateral lumbar radiculopathy    2. HNP (herniated nucleus pulposus), lumbar    3. Lumbar facet arthropathy    4. Muscle spasm    5. Severe obesity (BMI 35.0-39. 9) with comorbidity [facility])         IMPRESSION AND PLAN:  Fallon Gaitan is a 39 y.o. male with history of lumbar pain. Pt complains of pain across the lower back but he denies any pain radiating down the legs at this time. He also reports intermittent weakness in the legs with prolonged standing and walking. 1) Pt was given information on lumbar arthritis exercises. 2) Discussed treatment options with the patient including medication, physical therapy, and steroid injections. 3) I recommended that the patient lose weight and he was given information on the Mediterranean diet. 4) I encouraged the patient to change positions regularly and recommended a sit to stand workstation,. 5) I recommended the patient try chair and beginner's yoga. 6) Pt was counseled on proper lifting mechanics. 7) He will continue taking ibuprofen 800 mg as needed and does not need a refill at this time. 8) Mr. Sheree Soria has a reminder for a \"due or due soon\" health maintenance. I have asked that he contact his primary care provider, Tania Nickerson NP, for follow-up on this health maintenance. 9)  demonstrated consistency with prescribing. Follow-up and Dispositions    · Return if symptoms worsen or fail to improve. HISTORY OF PRESENT ILLNESS:  Fallon Gaitan is a 39 y.o. male with history of lumbar pain and presents to the office today for MRI follow up. Pt complains of pain across the lower back but he denies any pain radiating down the legs at this time.  He also reports intermittent weakness in the legs with prolonged standing and walking. Pt takes ibuprofen 800 mg as needed. Pt at this time desires to continue with current care. Of note, he is a nonsmoker. 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 Pain Scale: 0 - No pain/10    PCP: Destin Rivera NP     Past Medical History:   Diagnosis Date    Genital herpes 3/2/2015    Genital herpes in men     Hypertension     Prediabetes         Social History     Socioeconomic History    Marital status: UNKNOWN     Spouse name: Not on file    Number of children: Not on file    Years of education: Not on file    Highest education level: Not on file   Occupational History    Not on file   Social Needs    Financial resource strain: Not on file    Food insecurity     Worry: Not on file     Inability: Not on file    Transportation needs     Medical: Not on file     Non-medical: Not on file   Tobacco Use    Smoking status: Never Smoker    Smokeless tobacco: Never Used   Substance and Sexual Activity    Alcohol use: No    Drug use: No    Sexual activity: Not on file   Lifestyle    Physical activity     Days per week: Not on file     Minutes per session: Not on file    Stress: Not on file   Relationships    Social connections     Talks on phone: Not on file     Gets together: Not on file     Attends Restorationism service: Not on file     Active member of club or organization: Not on file     Attends meetings of clubs or organizations: Not on file     Relationship status: Not on file    Intimate partner violence     Fear of current or ex partner: Not on file     Emotionally abused: Not on file     Physically abused: Not on file     Forced sexual activity: Not on file   Other Topics Concern    Not on file   Social History Narrative    Not on file       Current Outpatient Medications   Medication Sig Dispense Refill    tadalafiL (Cialis) 20 mg tablet Take 1 Tab by mouth as needed for Erectile Dysfunction.  9 Tab 5    naproxen sodium (ANAPROX) 550 mg tablet TAKE 1 TABLET BY MOUTH TWICE A DAY WITH MEALS (Patient taking differently: Take 550 mg by mouth two (2) times daily as needed.) 30 Tab 0    metoprolol succinate (TOPROL-XL) 50 mg XL tablet Take 2 Tabs by mouth daily. 90 Tab 1    lisinopril-hydroCHLOROthiazide (PRINZIDE, ZESTORETIC) 20-25 mg per tablet Take 1 Tab by mouth daily. 90 Tab 1    clobetasoL (TEMOVATE) 0.05 % external solution Use bid 50 mL 5    valACYclovir (VALTREX) 1 gram tablet Take tid x7 days during outbreak. 30 Tab 3    diazePAM (VALIUM) 5 mg tablet Take 1 tab by mouth 30 minutes prior to procedure. May repeat x 1 if needed 2 Tab 0    orphenadrine citrate (NORFLEX) 100 mg sr tablet TAKE 1 TABLET BY MOUTH TWICE A DAY 30 Tab 0    clotrimazole-betamethasone (LOTRISONE) topical cream Use small amount bid until clear 15 g 0       No Known Allergies      REVIEW OF SYSTEMS    Constitutional: Negative for fever, chills, or weight change. Respiratory: Negative for cough or shortness of breath. Cardiovascular: Negative for chest pain or palpitations. Gastrointestinal: Negative for acid reflux, change in bowel habits, or constipation. Genitourinary: Negative for dysuria and flank pain. Musculoskeletal: Positive for lumbar pain  Neurological: Negative for headaches or dizziness. Positive for numbness. Psychiatric/Behavioral: Negative for difficulty with sleep. As per HPI    PHYSICAL EXAMINATION  Visit Vitals  /76 (BP 1 Location: Right arm, BP Patient Position: Sitting)   Pulse (!) 104 Comment: pt asymptomatic, MD aware   Temp 96.9 °F (36.1 °C) (Skin)   Resp 14   Ht 5' 8\" (1.727 m)   Wt 227 lb (103 kg)   SpO2 96% Comment: RA   BMI 34.52 kg/m²       Constitutional: Awake, alert, and in no acute distress. Neurological: 1+ symmetrical DTRs in the upper extremities. 1+ symmetrical DTRs in the lower extremities. Sensation to light touch is intact. Negative Napoles's sign bilaterally.   Skin: warm, dry, and intact. Musculoskeletal: Tenderness to palpation in the lower lumbar region. No pain with extension and axial loading. Improvement with forward flexion. No pain with internal or external rotation of his hips. Negative straight leg raise bilaterally. Hip Flex  Quads Hamstrings Ankle DF EHL Ankle PF   Right +4/5 +4/5 +4/5 +4/5 +4/5 +4/5   Left +4/5 +4/5 +4/5 +4/5 +4/5 +4/5     IMAGING:    Lumbar spine MRI from 8/17/2020 was personally reviewed with the patient and demonstrated:  Results from [place] encounter on 08/17/20   MRI LUMB SPINE WO CONT    Narrative EXAM: MRI LUMB SPINE WO CONT    CLINICAL INDICATIONS/HISTORY: He has increased lumbar pain with bilateral  radicular symptoms and difficulty standing/walking despite NSAID's and physical  therapy. COMPARISON: None    Technique: Multi-sequence multiplanar T1, T2, STIR imaging with and without fat  saturation obtained centered on the lumbar spine. FINDINGS:     Alignment: there is a 7 mm retrolisthesis of L5 on S1  Vertebral body height: Normal  Marrow signal: Unremarkable  Disc spaces: Mild disc space signal loss and narrowing L5-S1 level  Conus: Terminates at T12-L1    Axial imaging correlation:        L1-2: Patent canal and foramina. L2-3: Patent canal and foramina. L3-4: Patent canal and foramina. L4-5: Patent canal and foramina. L5-S1: Small left central herniation identified on image 5 series 3. A marker  has been placed. This exerts mild mass effect upon the exiting left S1 nerve  root. No mass effect seen on the exiting right S1 nerve root. L5 nerve roots exit through the neural foramina without compression    Other:    Impression IMPRESSION:    Small left central herniation associated with retrolisthesis L5-S1 causes mild  mass effect on the exiting left S1 nerve root. Written by Holly López, as dictated by Peter Sousa MD.  I, Dr. Peter Sousa confirm that all documentation is accurate.

## 2020-10-26 NOTE — PATIENT INSTRUCTIONS
Low Back Arthritis: Exercises Introduction Here are some examples of typical rehabilitation exercises for your condition. Start each exercise slowly. Ease off the exercise if you start to have pain. Your doctor or physical therapist will tell you when you can start these exercises and which ones will work best for you. When you are not being active, find a comfortable position for rest. Some people are comfortable on the floor or a medium-firm bed with a small pillow under their head and another under their knees. Some people prefer to lie on their side with a pillow between their knees. Don't stay in one position for too long. Take short walks (10 to 20 minutes) every 2 to 3 hours. Avoid slopes, hills, and stairs until you feel better. Walk only distances you can manage without pain, especially leg pain. How to do the exercises Pelvic tilt 1. Lie on your back with your knees bent. 2. \"Brace\" your stomachtighten your muscles by pulling in and imagining your belly button moving toward your spine. 3. Press your lower back into the floor. You should feel your hips and pelvis rock back. 4. Hold for 6 seconds while breathing smoothly. 5. Relax and allow your pelvis and hips to rock forward. 6. Repeat 8 to 12 times. Back stretches 1. Get down on your hands and knees on the floor. 2. Relax your head and allow it to droop. Round your back up toward the ceiling until you feel a nice stretch in your upper, middle, and lower back. Hold this stretch for as long as it feels comfortable, or about 15 to 30 seconds. 3. Return to the starting position with a flat back while you are on your hands and knees. 4. Let your back sway by pressing your stomach toward the floor. Lift your buttocks toward the ceiling. 5. Hold this position for 15 to 30 seconds. 6. Repeat 2 to 4 times. Follow-up care is a key part of your treatment and safety.  Be sure to make and go to all appointments, and call your doctor if you are having problems. It's also a good idea to know your test results and keep a list of the medicines you take. Where can you learn more? Go to http://www.gray.com/ Enter U130 in the search box to learn more about \"Low Back Arthritis: Exercises. \" Current as of: March 2, 2020               Content Version: 12.6 © 2006-2020 [employer]. Care instructions adapted under license by [employer] (which disclaims liability or warranty for this information). If you have questions about a medical condition or this instruction, always ask your healthcare professional. [address] any warranty or liability for your use of this information. Learning About the [address] Diet What is the Mediterranean diet? The Mediterranean diet is a style of eating rather than a diet plan. It features foods eaten in [place], [place], [place] and [place], and other countries along the [facility]. It emphasizes eating foods like fish, fruits, vegetables, beans, high-fiber breads and whole grains, nuts, and olive oil. This style of eating includes limited red meat, cheese, and sweets. Why choose the Mediterranean diet? A Mediterranean-style diet may improve heart health. It contains more fat than other heart-healthy diets. But the fats are mainly from nuts, unsaturated oils (such as fish oils and olive oil), and certain nut or seed oils (such as canola, soybean, or flaxseed oil). These fats may help protect the heart and blood vessels. How can you get started on the Mediterranean diet? Here are some things you can do to switch to a more Mediterranean way of eating. What to eat · Eat a variety of fruits and vegetables each day, such as grapes, blueberries, tomatoes, broccoli, peppers, figs, olives, spinach, eggplant, beans, lentils, and chickpeas. · Eat a variety of whole-grain foods each day, such as oats, brown rice, and whole wheat bread, pasta, and couscous. · Eat fish at least 2 times a week. Try tuna, salmon, mackerel, lake trout, herring, or sardines. · Eat moderate amounts of low-fat dairy products, such as milk, cheese, or yogurt. · Eat moderate amounts of poultry and eggs. · Choose healthy (unsaturated) fats, such as nuts, olive oil, and certain nut or seed oils like canola, soybean, and flaxseed. · Limit unhealthy (saturated) fats, such as butter, palm oil, and coconut oil. And limit fats found in animal products, such as meat and dairy products made with whole milk. Try to eat red meat only a few times a month in very small amounts. · Limit sweets and desserts to only a few times a week. This includes sugar-sweetened drinks like soda. The Mediterranean diet may also include red wine with your meal1 glass each day for women and up to 2 glasses a day for men. Tips for eating at home · Use herbs, spices, garlic, lemon zest, and citrus juice instead of salt to add flavor to foods. · Add avocado slices to your sandwich instead of gamble. · Have fish for lunch or dinner instead of red meat. Brush the fish with olive oil, and broil or grill it. · Sprinkle your salad with seeds or nuts instead of cheese. · Cook with olive or canola oil instead of butter or oils that are high in saturated fat. · Switch from 2% milk or whole milk to 1% or fat-free milk. · Dip raw vegetables in a vinaigrette dressing or hummus instead of dips made from mayonnaise or sour cream. 
· Have a piece of fruit for dessert instead of a piece of cake. Try baked apples, or have some dried fruit. Tips for eating out · Try broiled, grilled, baked, or poached fish instead of having it fried or breaded. · Ask your  to have your meals prepared with olive oil instead of butter. · Order dishes made with marinara sauce or sauces made from olive oil. Avoid sauces made from cream or mayonnaise. · Choose whole-grain breads, whole wheat pasta and pizza crust, brown rice, beans, and lentils. · Cut back on butter or margarine on bread. Instead, you can dip your bread in a small amount of olive oil. · Ask for a side salad or grilled vegetables instead of french fries or chips. Where can you learn more? Go to http://www.Erydel/ Enter 394-025-8497 in the search box to learn more about \"Learning About the Mediterranean Diet. \" Current as of: August 22, 2019               Content Version: 12.6 © 3604-9223 [employer]. Care instructions adapted under license by [employer] (which disclaims liability or warranty for this information). If you have questions about a medical condition or this instruction, always ask your healthcare professional. [address] any warranty or liability for your use of this information.

## 2020-10-26 NOTE — PROGRESS NOTES
Deborah Shin presents today for   Chief Complaint   Patient presents with    Back Pain       Is someone accompanying this pt? no    Is the patient using any DME equipment during OV? no    Depression Screening:  3 most recent PHQ Screens 1/20/2020   Little interest or pleasure in doing things Not at all   Feeling down, depressed, irritable, or hopeless Not at all   Total Score PHQ 2 0       Learning Assessment:  Learning Assessment 1/27/2015   PRIMARY LEARNER Patient   HIGHEST LEVEL OF EDUCATION - PRIMARY LEARNER  > 4 YEARS OF COLLEGE   BARRIERS PRIMARY LEARNER NONE   CO-LEARNER CAREGIVER No   PRIMARY LANGUAGE ENGLISH   LEARNER PREFERENCE PRIMARY READING     DEMONSTRATION   ANSWERED BY patient   RELATIONSHIP SELF       Abuse Screening:  Abuse Screening Questionnaire 1/27/2015   Do you ever feel afraid of your partner? N   Are you in a relationship with someone who physically or mentally threatens you? N   Is it safe for you to go home? Y         Coordination of Care:  1. Have you been to the ER, urgent care clinic since your last visit? Yes, pt went to [facility] for nausea a couple of weeks ago. Pt states he was diagnosed with stomach flu  Hospitalized since your last visit? no    2. Have you seen or consulted any other health care providers outside of the [address] [place] [address] since your last visit? no Include any pap smears or colon screening.  no

## 2020-10-26 NOTE — LETTER
10/28/20 Patient: Linward Sacks YOB: 1975 Date of Visit: 10/26/2020 Vester Cushing, NP 
[address] [facility] [address] [place] [id number] VIA In Basket Dear Vester Cushing, NP, Thank you for referring Mr. Linward Sacks to [address] [place] [address] for evaluation. My notes for this consultation are attached. If you have questions, please do not hesitate to call me. I look forward to following your patient along with you. Sincerely, Helena Freeman MD

## 2020-11-19 RX ORDER — METOPROLOL SUCCINATE 50 MG/1
100 TABLET, EXTENDED RELEASE ORAL DAILY
Qty: 90 TAB | Refills: 1 | OUTPATIENT
Start: 2020-11-19

## 2020-11-28 DIAGNOSIS — N52.9 ERECTILE DYSFUNCTION, UNSPECIFIED ERECTILE DYSFUNCTION TYPE: ICD-10-CM

## 2020-11-28 RX ORDER — TADALAFIL 20 MG/1
20 TABLET ORAL AS NEEDED
Qty: 9 TAB | Refills: 5 | Status: CANCELLED | OUTPATIENT
Start: 2020-11-28

## 2021-01-13 NOTE — TELEPHONE ENCOUNTER
Last Visit: 7/20/20 with JABARI Ray  Next Appointment: Advised to follow-up in 6 months  Previous Refill Encounter(s): 7/20/20 #90 with 1 refill    Requested Prescriptions     Pending Prescriptions Disp Refills    metoprolol succinate (TOPROL-XL) 50 mg XL tablet [Pharmacy Med Name: METOPROLOL SUCC ER 50 MG TAB] 90 Tab 0     Sig: TAKE 2 TABLETS BY MOUTH EVERY DAY

## 2021-01-21 RX ORDER — METOPROLOL SUCCINATE 50 MG/1
50 TABLET, EXTENDED RELEASE ORAL DAILY
Qty: 30 TAB | Refills: 0 | Status: SHIPPED | OUTPATIENT
Start: 2021-01-21 | End: 2021-02-21

## 2021-01-21 RX ORDER — METOPROLOL SUCCINATE 50 MG/1
50 TABLET, EXTENDED RELEASE ORAL DAILY
Qty: 30 TAB | Refills: 0 | Status: SHIPPED | OUTPATIENT
Start: 2021-01-21 | End: 2021-01-21 | Stop reason: SDUPTHER

## 2021-06-04 DIAGNOSIS — N52.9 ERECTILE DYSFUNCTION, UNSPECIFIED ERECTILE DYSFUNCTION TYPE: ICD-10-CM

## 2021-06-04 RX ORDER — TADALAFIL 20 MG/1
20 TABLET ORAL AS NEEDED
Qty: 9 TABLET | Refills: 5 | Status: CANCELLED | OUTPATIENT
Start: 2021-06-04

## 2021-06-04 NOTE — TELEPHONE ENCOUNTER
Last Visit: 7/20/20 with JABARI Ray  Next Appointment: none  Previous Refill Encounter(s): 10/12/20 #9 with 5 refills    Requested Prescriptions     Pending Prescriptions Disp Refills    tadalafiL (Cialis) 20 mg tablet 9 Tablet 5     Sig: Take 1 Tablet by mouth as needed for Erectile Dysfunction.

## 2022-01-11 LAB — SARS-COV-2, NAA: NOT DETECTED

## 2022-03-18 PROBLEM — E66.01 SEVERE OBESITY (HCC): Status: ACTIVE | Noted: 2018-10-18

## 2022-03-23 DIAGNOSIS — L30.9 DERMATITIS: ICD-10-CM

## 2022-03-23 RX ORDER — CLOBETASOL PROPIONATE 0.46 MG/ML
SOLUTION TOPICAL
Qty: 50 ML | Refills: 5 | Status: CANCELLED | OUTPATIENT
Start: 2022-03-23

## 2022-03-23 NOTE — TELEPHONE ENCOUNTER
Last Visit: 7/20/20 with JABARI Ray  Next Appointment: none  Previous Refill Encounter(s): 7/20/20 #50 with 5 refills    Requested Prescriptions     Pending Prescriptions Disp Refills    clobetasoL (TEMOVATE) 0.05 % external solution 50 mL 5     Sig: Use bid

## 2022-06-29 ENCOUNTER — TELEPHONE (OUTPATIENT)
Dept: FAMILY MEDICINE CLINIC | Age: 47
End: 2022-06-29

## 2022-12-30 DIAGNOSIS — I10 ESSENTIAL HYPERTENSION: ICD-10-CM

## 2022-12-30 DIAGNOSIS — A60.00 GENITAL HERPES: ICD-10-CM

## 2022-12-30 NOTE — TELEPHONE ENCOUNTER
----- Message from Mikel Jones sent at 12/30/2022 11:09 AM EST -----  Subject: Refill Request    QUESTIONS  Name of Medication? Other - lisinopril  Patient-reported dosage and instructions? 20MG taken once daily  How many days do you have left? 0  [id number]  Pharmacy phone number (if available)? [phone]  ---------------------------------------------------------------------------  --------------,  Name of Medication? Other - metropolol  Patient-reported dosage and instructions? 50MG; 2 tablets by mouth every   day  How many days do you have left? 0  [id number]  Pharmacy phone number (if available)? [phone]  Additional Information for Provider? Pt ran out of these Rx's months ago   and they were prescribed by past PCP Marixa Cast (not PCP Los Barrios). Pt   has an upcoming appt with PCP Los Barrios on 1/20/23 at 1:00pm and he'd like   refills beforehand.   ---------------------------------------------------------------------------  --------------,  Name of Medication? Other - valacyclovir  Patient-reported dosage and instructions? 1G taken daily or PRN  How many days do you have left? 0  [id number]  Pharmacy phone number (if available)? [phone]  ---------------------------------------------------------------------------  --------------  CALL BACK INFO  What is the best way for the office to contact you? OK to leave message on   voicemail  Preferred Call Back Phone Number? [phone]  ---------------------------------------------------------------------------  --------------  SCRIPT ANSWERS  Relationship to Patient?  Self

## 2023-01-03 RX ORDER — VALACYCLOVIR HYDROCHLORIDE 1 G/1
TABLET, FILM COATED ORAL
Qty: 30 TABLET | Refills: 3 | OUTPATIENT
Start: 2023-01-03

## 2023-01-03 RX ORDER — LISINOPRIL AND HYDROCHLOROTHIAZIDE 20; 25 MG/1; MG/1
1 TABLET ORAL DAILY
Qty: 90 TABLET | Refills: 1 | OUTPATIENT
Start: 2023-01-03

## 2023-01-03 RX ORDER — METOPROLOL SUCCINATE 50 MG/1
50 TABLET, EXTENDED RELEASE ORAL DAILY
Qty: 15 TABLET | Refills: 0 | OUTPATIENT
Start: 2023-01-03

## 2023-01-04 ENCOUNTER — TELEPHONE (OUTPATIENT)
Dept: FAMILY MEDICINE CLINIC | Age: 48
End: 2023-01-04

## 2023-01-04 NOTE — TELEPHONE ENCOUNTER
----- Message from Brady Cheema sent at 12/30/2022 11:01 AM EST -----  Subject: Referral Request    Reason for referral request? Pt wants blood work for his upcoming appt   annual physical with PCP Christen Ramires on 1/20/22 at 1pm. Pt wants routine   bloodwork to check cholesterol, prediabetes, and whatever else PCP wants   ordered like in the past. Pt wants to know if he will need to fast and if   he can do bloodwork during the annual physical appt. Provider patient wants to be referred to(if known): Moshe Lawrence    Provider Phone Number(if known):     Additional Information for Provider?   ---------------------------------------------------------------------------  --------------  [address] [facility]    [phone]; OK to leave message on voicemail  ---------------------------------------------------------------------------  --------------

## 2023-01-13 ENCOUNTER — TELEPHONE (OUTPATIENT)
Dept: FAMILY MEDICINE CLINIC | Age: 48
End: 2023-01-13

## 2023-01-13 NOTE — TELEPHONE ENCOUNTER
----- Message from Gregory Hoang sent at 12/30/2022 10:56 AM EST -----  Subject: Referral Request    Reason for referral request? Pt would like an order be placed for a   preventative routine colonoscopy. Pt's PCP is Iva. Please advise. Provider patient wants to be referred to(if known):     Provider Phone Number(if known):     Additional Information for Provider?   ---------------------------------------------------------------------------  --------------  [address]    [phone]; OK to leave message on voicemail  ---------------------------------------------------------------------------  --------------

## 2023-01-20 ENCOUNTER — OFFICE VISIT (OUTPATIENT)
Dept: FAMILY MEDICINE CLINIC | Age: 48
End: 2023-01-20
Payer: MEDICAID

## 2023-01-20 VITALS
RESPIRATION RATE: 18 BRPM | HEIGHT: 68 IN | TEMPERATURE: 98.1 F | HEART RATE: 78 BPM | OXYGEN SATURATION: 98 % | WEIGHT: 214.2 LBS | SYSTOLIC BLOOD PRESSURE: 178 MMHG | DIASTOLIC BLOOD PRESSURE: 92 MMHG | BODY MASS INDEX: 32.46 KG/M2

## 2023-01-20 DIAGNOSIS — A60.00 GENITAL HERPES: ICD-10-CM

## 2023-01-20 DIAGNOSIS — E66.01 SEVERE OBESITY (HCC): ICD-10-CM

## 2023-01-20 DIAGNOSIS — Z13.220 SCREENING FOR CHOLESTEROL LEVEL: ICD-10-CM

## 2023-01-20 DIAGNOSIS — L30.9 DERMATITIS: ICD-10-CM

## 2023-01-20 DIAGNOSIS — I10 ESSENTIAL HYPERTENSION: Primary | ICD-10-CM

## 2023-01-20 DIAGNOSIS — Z11.3 SCREENING FOR STD (SEXUALLY TRANSMITTED DISEASE): ICD-10-CM

## 2023-01-20 DIAGNOSIS — M25.561 ACUTE PAIN OF RIGHT KNEE: ICD-10-CM

## 2023-01-20 DIAGNOSIS — Z12.11 SCREEN FOR COLON CANCER: ICD-10-CM

## 2023-01-20 DIAGNOSIS — Z13.29 SCREENING FOR THYROID DISORDER: ICD-10-CM

## 2023-01-20 DIAGNOSIS — B00.9 HERPES SIMPLEX VIRUS (HSV) INFECTION: ICD-10-CM

## 2023-01-20 PROCEDURE — 3074F SYST BP LT 130 MM HG: CPT | Performed by: NURSE PRACTITIONER

## 2023-01-20 PROCEDURE — 99214 OFFICE O/P EST MOD 30 MIN: CPT | Performed by: NURSE PRACTITIONER

## 2023-01-20 PROCEDURE — 3078F DIAST BP <80 MM HG: CPT | Performed by: NURSE PRACTITIONER

## 2023-01-20 RX ORDER — METOPROLOL SUCCINATE 50 MG/1
50 TABLET, EXTENDED RELEASE ORAL DAILY
Qty: 90 TABLET | Refills: 1 | Status: SHIPPED | OUTPATIENT
Start: 2023-01-20

## 2023-01-20 RX ORDER — VALACYCLOVIR HYDROCHLORIDE 1 G/1
TABLET, FILM COATED ORAL
Qty: 30 TABLET | Refills: 3 | Status: CANCELLED | OUTPATIENT
Start: 2023-01-20

## 2023-01-20 RX ORDER — KETOCONAZOLE 20 MG/ML
SHAMPOO, SUSPENSION TOPICAL
Qty: 120 ML | Refills: 3 | Status: SHIPPED | OUTPATIENT
Start: 2023-01-20

## 2023-01-20 RX ORDER — VALACYCLOVIR HYDROCHLORIDE 1 G/1
TABLET, FILM COATED ORAL
Qty: 30 TABLET | Refills: 3 | Status: SHIPPED | OUTPATIENT
Start: 2023-01-20

## 2023-01-20 RX ORDER — LISINOPRIL AND HYDROCHLOROTHIAZIDE 20; 25 MG/1; MG/1
1 TABLET ORAL DAILY
Qty: 90 TABLET | Refills: 1 | Status: SHIPPED | OUTPATIENT
Start: 2023-01-20

## 2023-01-20 RX ORDER — NAPROXEN SODIUM 550 MG/1
550 TABLET ORAL 2 TIMES DAILY WITH MEALS
Qty: 30 TABLET | Refills: 0 | Status: CANCELLED | OUTPATIENT
Start: 2023-01-20

## 2023-01-20 NOTE — PROGRESS NOTES
1. \"Have you been to the ER, urgent care clinic since your last visit? Hospitalized since your last visit? \" {Yes when where and reason for visit:20441}    2. \"Have you seen or consulted any other health care providers outside of the [address] [place] [address] since your last visit? \" {Yes when where and reason for visit:20441}     3. For patients aged 39-70: Has the patient had a colonoscopy / FIT/ Cologuard?  {Colon Cancer Care Gap present?:50893}

## 2023-01-20 NOTE — PROGRESS NOTES
Hypertension      Assessment/Plan:     {Disease & control:570249}. {disease follow up plans:896732}. Diagnoses and all orders for this visit:    1. Essential hypertension    2. Genital herpes    3. Severe obesity ([address]    4. Screening for cholesterol level    5. Screening for thyroid disorder    6. Screening for STD (sexually transmitted disease)            Subjective:     Ed Diehl is a 52 y.o. BLACK/ male who presents for follow-up of hypertension. Pt denies any SOB, edema, CP, orthopnea, palpitations, nocturnal dyspnea, headaches, or blurred vision. Diet and Lifestyle: {Diet, Lifestyle:97694}  Home BP Monitoring: {Home BP Monitoring results:27979}    Cardiovascular ROS: {htn cvs ros:784117::\"taking medications as instructed\",\"no medication side effects noted\",\"no TIA's\",\"no chest pain on exertion\",\"no dyspnea on exertion\",\"no swelling of ankles\"}. New concerns: Pt has had right knee pain for the last 3 years. Pt has a hx of a meniscus repair back in 2010. Pt states the pain is located on his bilateral sides of his knee. Pt has been using an OTC brace to the pain, and denies any swelling, but does say that he can hear a popping sensation. Pt rates the pain a 3/10, and is achy, and stiff that is intermittent. Key Antihyperlipidemia Meds       The patient is on no antihyperlipidemia meds. Current Outpatient Medications   Medication Sig Dispense Refill    metoprolol succinate (TOPROL-XL) 50 mg XL tablet TAKE 1 TAB BY MOUTH DAILY. PT NEEDS AN APPOINTMENT PRIOR TO NEXT REFILL. 15 Tab 0    naproxen sodium (ANAPROX) 550 mg tablet TAKE 1 TABLET BY MOUTH TWICE A DAY WITH MEALS (Patient taking differently: Take 550 mg by mouth two (2) times daily as needed.) 30 Tab 0    lisinopril-hydroCHLOROthiazide (PRINZIDE, ZESTORETIC) 20-25 mg per tablet Take 1 Tab by mouth daily. 90 Tab 1    valACYclovir (VALTREX) 1 gram tablet Take tid x7 days during outbreak.  30 Tab 3    tadalafiL (Cialis) 20 mg tablet Take 1 Tab by mouth as needed for Erectile Dysfunction. (Patient not taking: Reported on 1/20/2023) 9 Tab 5    diazePAM (VALIUM) 5 mg tablet Take 1 tab by mouth 30 minutes prior to procedure. May repeat x 1 if needed (Patient not taking: Reported on 1/20/2023) 2 Tab 0    clobetasoL (TEMOVATE) 0.05 % external solution Use bid (Patient not taking: Reported on 1/20/2023) 50 mL 5    orphenadrine citrate (NORFLEX) 100 mg sr tablet TAKE 1 TABLET BY MOUTH TWICE A DAY (Patient not taking: Reported on 1/20/2023) 30 Tab 0    clotrimazole-betamethasone (LOTRISONE) topical cream Use small amount bid until clear (Patient not taking: Reported on 1/20/2023) 15 g 0       Review of Systems, additional:  {ros - complete:94942::\"Pertinent items are noted in HPI. \"}    Objective:   Visit Vitals  BP (!) 158/78 (BP 1 Location: Right upper arm, BP Patient Position: Sitting, BP Cuff Size: Large adult)   Pulse 78   Temp 98.1 °F (36.7 °C) (Temporal)   Resp 18   Ht 5' 8\" (1.727 m)   Wt 214 lb 3.2 oz (97.2 kg)   SpO2 98%   BMI 32.57 kg/m²       Lab Results   Component Value Date/Time    Sodium 138 01/20/2020 04:06 PM    Potassium 4.1 01/20/2020 04:06 PM    Chloride 98 01/20/2020 04:06 PM    CO2 26 01/20/2020 04:06 PM    Anion gap 14.0 01/20/2020 04:06 PM    Glucose 185 (H) 01/20/2020 04:06 PM    BUN 11 01/20/2020 04:06 PM    Creatinine 1.2 01/20/2020 04:06 PM    BUN/Creatinine ratio 7 (L) 06/25/2019 02:28 PM    GFR est AA >60 06/25/2019 02:28 PM    GFR est non-AA >60 06/25/2019 02:28 PM    Calcium 9.5 01/20/2020 04:06 PM     Lab Results   Component Value Date/Time    HGB 14.7 10/18/2018 03:56 PM    HCT 44.4 10/18/2018 03:56 PM     Lab Results   Component Value Date/Time    Hemoglobin A1c 5.0 01/05/2016 03:00 PM     Lab Results   Component Value Date/Time    Cholesterol, total 225 (H) 01/20/2020 04:06 PM    HDL Cholesterol 34 (L) 01/20/2020 04:06 PM    LDL, calculated 161 (H) 01/20/2020 04:06 PM    VLDL, calculated 30 01/20/2020 04:06 PM    Triglyceride 152 (H) 01/20/2020 04:06 PM    CHOL/HDL Ratio 5.7 (H) 06/25/2019 02:28 PM       Appearance: {appearance:157001::\"alert, well appearing, and in no distress\"}. General exam {htn exam:488394}. Lab review: {lab reviewed:622626}. Disclaimer: The patient understands our medical plan. Alternatives have been explained and offered. The risks, benefits and significant side effects of all medications have been reviewed. Anticipated time course and progression of condition reviewed. All questions have been addressed. He is encouraged to employ the information provided in the after visit summary, which was reviewed. Where applicable, he is instructed to call the clinic if he has not been notified either by phone or through [address] with the results of his tests or with an appointment plan for any referrals within 1 week(s). No news is not good news; it's no news. The patient  is to call if his condition worsens or fails to improve or if significant side effects are experienced. Aspects of this note may have been generated using voice recognition software. Despite editing, there may be unrecognized errors.       Aldair Owen NP     01/20/23 Appearance: alert, well appearing, and in no distress and oriented to person, place, and time. General exam BP noted to be moderately elevated today in office, S1, S2 normal, no gallop, no murmur, chest clear, no JVD, no HSM, no edema. Lab review: orders written for new lab studies as appropriate; see orders. Disclaimer: The patient understands our medical plan. Alternatives have been explained and offered. The risks, benefits and significant side effects of all medications have been reviewed. Anticipated time course and progression of condition reviewed. All questions have been addressed. He is encouraged to employ the information provided in the after visit summary, which was reviewed. Where applicable, he is instructed to call the clinic if he has not been notified either by phone or through [address] with the results of his tests or with an appointment plan for any referrals within 1 week(s). No news is not good news; it's no news. The patient  is to call if his condition worsens or fails to improve or if significant side effects are experienced. Aspects of this note may have been generated using voice recognition software. Despite editing, there may be unrecognized errors.       Anupama Ribera NP     01/30/23

## 2023-01-24 LAB
BUN SERPL-MCNC: 11 MG/DL (ref 6–24)
BUN/CREAT SERPL: 9 (ref 9–20)
C TRACH RRNA SPEC QL NAA+PROBE: NEGATIVE
CALCIUM SERPL-MCNC: 9.4 MG/DL (ref 8.7–10.2)
CHLORIDE SERPL-SCNC: 104 MMOL/L (ref 96–106)
CHOLEST SERPL-MCNC: 192 MG/DL (ref 100–199)
CO2 SERPL-SCNC: 22 MMOL/L (ref 20–29)
CREAT SERPL-MCNC: 1.16 MG/DL (ref 0.76–1.27)
EGFR: 78 ML/MIN/1.73
EST. AVERAGE GLUCOSE BLD GHB EST-MCNC: 108 MG/DL
GLUCOSE SERPL-MCNC: 94 MG/DL (ref 70–99)
HBA1C MFR BLD: 5.4 % (ref 4.8–5.6)
HBV SURFACE AG SERPL QL IA: NEGATIVE
HCV AB S/CO SERPL IA: <0.1 S/CO RATIO (ref 0–0.9)
HDLC SERPL-MCNC: 42 MG/DL
HIV 1+2 AB+HIV1 P24 AG SERPL QL IA: NON REACTIVE
LDLC SERPL CALC-MCNC: 135 MG/DL (ref 0–99)
N GONORRHOEA RRNA SPEC QL NAA+PROBE: NEGATIVE
POTASSIUM SERPL-SCNC: 4.5 MMOL/L (ref 3.5–5.2)
SODIUM SERPL-SCNC: 145 MMOL/L (ref 134–144)
TREPONEMA PALLIDUM IGG+IGM AB [PRESENCE] IN SERUM OR PLASMA BY IMMUNOASSAY: NON REACTIVE
TRIGL SERPL-MCNC: 82 MG/DL (ref 0–149)
TSH SERPL DL<=0.005 MIU/L-ACNC: 1.28 UIU/ML (ref 0.45–4.5)
VLDLC SERPL CALC-MCNC: 15 MG/DL (ref 5–40)

## 2023-01-30 NOTE — PROGRESS NOTES
Please send pt a lab letter or Mychart letter stating that his labs are normal except for his cholesterol is slightly elevated and to just work on a low fat diet.

## 2023-02-03 ENCOUNTER — OFFICE VISIT (OUTPATIENT)
Dept: FAMILY MEDICINE CLINIC | Age: 48
End: 2023-02-03
Payer: MEDICAID

## 2023-02-03 VITALS
BODY MASS INDEX: 32.31 KG/M2 | SYSTOLIC BLOOD PRESSURE: 139 MMHG | HEART RATE: 73 BPM | WEIGHT: 213.2 LBS | RESPIRATION RATE: 16 BRPM | TEMPERATURE: 97.9 F | OXYGEN SATURATION: 97 % | DIASTOLIC BLOOD PRESSURE: 84 MMHG | HEIGHT: 68 IN

## 2023-02-03 DIAGNOSIS — I10 ESSENTIAL HYPERTENSION: Primary | ICD-10-CM

## 2023-02-03 PROCEDURE — 99213 OFFICE O/P EST LOW 20 MIN: CPT | Performed by: NURSE PRACTITIONER

## 2023-02-03 PROCEDURE — 3078F DIAST BP <80 MM HG: CPT | Performed by: NURSE PRACTITIONER

## 2023-02-03 PROCEDURE — 3074F SYST BP LT 130 MM HG: CPT | Performed by: NURSE PRACTITIONER

## 2023-02-03 NOTE — PROGRESS NOTES
1. \"Have you been to the ER, urgent care clinic since your last visit? Hospitalized since your last visit? \" No    2. \"Have you seen or consulted any other health care providers outside of the [address] [place] [address] since your last visit? \" No     3. For patients aged 39-70: Has the patient had a colonoscopy / FIT/ Cologuard?  No

## 2023-02-03 NOTE — PROGRESS NOTES
Hypertension      Assessment/Plan:     {Disease & control:094114}. {disease follow up plans:096975}. {There are no diagnoses linked to this encounter. (Refresh or delete this SmartLink)}          Subjective:     Rosalina Santacruz is a 52 y.o. BLACK/ male who presents for follow-up of hypertension. Pt denies any SOB, edema, CP, orthopnea, palpitations, nocturnal dyspnea, headaches, or blurred vision. Diet and Lifestyle: {Diet, Lifestyle:18132}  Home BP Monitoring: {Home BP Monitoring results:38834}    Cardiovascular ROS: {htn cvs ros:885603::\"taking medications as instructed\",\"no medication side effects noted\",\"no TIA's\",\"no chest pain on exertion\",\"no dyspnea on exertion\",\"no swelling of ankles\"}    New concerns: ***. Key Antihyperlipidemia Meds       The patient is on no antihyperlipidemia meds. Current Outpatient Medications   Medication Sig Dispense Refill    lisinopril-hydroCHLOROthiazide (PRINZIDE, ZESTORETIC) 20-25 mg per tablet Take 1 Tablet by mouth daily. 90 Tablet 1    metoprolol succinate (TOPROL-XL) 50 mg XL tablet Take 1 Tablet by mouth daily. 90 Tablet 1    valACYclovir (VALTREX) 1 gram tablet Take tid x7 days during outbreak. 30 Tablet 3    ketoconazole (NIZORAL) 2 % shampoo Use daily as needed until symptoms subside then discontinue 120 mL 3    tadalafiL (Cialis) 20 mg tablet Take 1 Tab by mouth as needed for Erectile Dysfunction. (Patient not taking: Reported on 1/20/2023) 9 Tab 5    naproxen sodium (ANAPROX) 550 mg tablet TAKE 1 TABLET BY MOUTH TWICE A DAY WITH MEALS (Patient not taking: Reported on 2/3/2023) 30 Tab 0    diazePAM (VALIUM) 5 mg tablet Take 1 tab by mouth 30 minutes prior to procedure.  May repeat x 1 if needed (Patient not taking: Reported on 1/20/2023) 2 Tab 0    clobetasoL (TEMOVATE) 0.05 % external solution Use bid (Patient not taking: Reported on 1/20/2023) 50 mL 5    orphenadrine citrate (NORFLEX) 100 mg sr tablet TAKE 1 TABLET BY MOUTH TWICE A DAY (Patient not taking: Reported on 1/20/2023) 30 Tab 0    clotrimazole-betamethasone (LOTRISONE) topical cream Use small amount bid until clear (Patient not taking: Reported on 1/20/2023) 15 g 0       Review of Systems, additional:  {ros - complete:01745::\"Pertinent items are noted in HPI. \"}    Objective:   Visit Vitals  /84 (BP 1 Location: Left upper arm, BP Patient Position: Sitting, BP Cuff Size: Large adult)   Pulse 73   Temp 97.9 °F (36.6 °C) (Temporal)   Resp 16   Ht 5' 8\" (1.727 m)   Wt 213 lb 3.2 oz (96.7 kg)   SpO2 97%   BMI 32.42 kg/m²       Lab Results   Component Value Date/Time    Sodium 145 (H) 01/20/2023 02:20 PM    Potassium 4.5 01/20/2023 02:20 PM    Chloride 104 01/20/2023 02:20 PM    CO2 22 01/20/2023 02:20 PM    Anion gap 14.0 01/20/2020 04:06 PM    Glucose 94 01/20/2023 02:20 PM    BUN 11 01/20/2023 02:20 PM    Creatinine 1.16 01/20/2023 02:20 PM    BUN/Creatinine ratio 9 01/20/2023 02:20 PM    GFR est AA >60 06/25/2019 02:28 PM    GFR est non-AA >60 06/25/2019 02:28 PM    Calcium 9.4 01/20/2023 02:20 PM     Lab Results   Component Value Date/Time    HGB 14.7 10/18/2018 03:56 PM    HCT 44.4 10/18/2018 03:56 PM     Lab Results   Component Value Date/Time    Hemoglobin A1c 5.4 01/20/2023 02:20 PM     Lab Results   Component Value Date/Time    Cholesterol, total 192 01/20/2023 02:20 PM    HDL Cholesterol 42 01/20/2023 02:20 PM    LDL, calculated 135 (H) 01/20/2023 02:20 PM    LDL, calculated 161 (H) 01/20/2020 04:06 PM    VLDL, calculated 15 01/20/2023 02:20 PM    VLDL, calculated 30 01/20/2020 04:06 PM    Triglyceride 82 01/20/2023 02:20 PM    CHOL/HDL Ratio 5.7 (H) 06/25/2019 02:28 PM       Appearance: {appearance:115412::\"alert, well appearing, and in no distress\"}. General exam {htn exam:461889}. Lab review: {lab reviewed:270084}. Disclaimer: The patient understands our medical plan. Alternatives have been explained and offered.   The risks, benefits and significant side effects of all medications have been reviewed. Anticipated time course and progression of condition reviewed. All questions have been addressed. He is encouraged to employ the information provided in the after visit summary, which was reviewed. Where applicable, he is instructed to call the clinic if he has not been notified either by phone or through [address] with the results of his tests or with an appointment plan for any referrals within 1 week(s). No news is not good news; it's no news. The patient  is to call if his condition worsens or fails to improve or if significant side effects are experienced. Aspects of this note may have been generated using voice recognition software. Despite editing, there may be unrecognized errors.       Luly Tellez NP     02/03/23

## 2023-02-21 ENCOUNTER — HOSPITAL ENCOUNTER (OUTPATIENT)
Facility: HOSPITAL | Age: 48
Discharge: HOME | End: 2023-02-24
Payer: COMMERCIAL

## 2023-02-21 DIAGNOSIS — M25.561 RIGHT KNEE PAIN, UNSPECIFIED CHRONICITY: ICD-10-CM

## 2023-02-21 PROCEDURE — 73564 X-RAY EXAM KNEE 4 OR MORE: CPT

## 2023-02-25 ENCOUNTER — HOSPITAL ENCOUNTER (EMERGENCY)
Facility: HOSPITAL | Age: 48
Discharge: HOME | End: 2023-02-25
Attending: EMERGENCY MEDICINE
Payer: COMMERCIAL

## 2023-02-25 VITALS
HEART RATE: 96 BPM | DIASTOLIC BLOOD PRESSURE: 85 MMHG | BODY MASS INDEX: 31.83 KG/M2 | RESPIRATION RATE: 20 BRPM | SYSTOLIC BLOOD PRESSURE: 150 MMHG | OXYGEN SATURATION: 98 % | TEMPERATURE: 99.2 F | HEIGHT: 68 IN | WEIGHT: 210 LBS

## 2023-02-25 DIAGNOSIS — J40 BRONCHITIS: Primary | ICD-10-CM

## 2023-02-25 PROCEDURE — 2500000003 HC RX 250 WO HCPCS: Performed by: EMERGENCY MEDICINE

## 2023-02-25 PROCEDURE — 99283 EMERGENCY DEPT VISIT LOW MDM: CPT

## 2023-02-25 PROCEDURE — 6370000000 HC RX 637 (ALT 250 FOR IP): Performed by: EMERGENCY MEDICINE

## 2023-02-25 RX ORDER — GUAIFENESIN AND CODEINE PHOSPHATE 100; 10 MG/5ML; MG/5ML
10 SOLUTION ORAL 3 TIMES DAILY PRN
Qty: 150 ML | Refills: 0 | Status: SHIPPED | OUTPATIENT
Start: 2023-02-25 | End: 2023-02-25 | Stop reason: SDUPTHER

## 2023-02-25 RX ORDER — AZITHROMYCIN 250 MG/1
TABLET, FILM COATED ORAL
Qty: 4 TABLET | Refills: 0 | Status: SHIPPED | OUTPATIENT
Start: 2023-02-25 | End: 2023-03-01

## 2023-02-25 RX ORDER — ACETAMINOPHEN 325 MG/1
650 TABLET ORAL
Status: COMPLETED | OUTPATIENT
Start: 2023-02-25 | End: 2023-02-25

## 2023-02-25 RX ORDER — GUAIFENESIN 200 MG/10ML
200 LIQUID ORAL
Status: COMPLETED | OUTPATIENT
Start: 2023-02-25 | End: 2023-02-25

## 2023-02-25 RX ORDER — GUAIFENESIN AND CODEINE PHOSPHATE 100; 10 MG/5ML; MG/5ML
10 SOLUTION ORAL 3 TIMES DAILY PRN
Qty: 150 ML | Refills: 0 | Status: SHIPPED | OUTPATIENT
Start: 2023-02-25 | End: 2023-03-04

## 2023-02-25 RX ORDER — AZITHROMYCIN 250 MG/1
500 TABLET, FILM COATED ORAL ONCE
Status: COMPLETED | OUTPATIENT
Start: 2023-02-25 | End: 2023-02-25

## 2023-02-25 RX ADMIN — GUAIFENESIN 200 MG: 200 SOLUTION ORAL at 05:32

## 2023-02-25 RX ADMIN — ACETAMINOPHEN 650 MG: 325 TABLET ORAL at 05:32

## 2023-02-25 RX ADMIN — AZITHROMYCIN MONOHYDRATE 500 MG: 250 TABLET ORAL at 05:31

## 2023-02-25 ASSESSMENT — ENCOUNTER SYMPTOMS
ABDOMINAL PAIN: 0
SHORTNESS OF BREATH: 0
COUGH: 1
SORE THROAT: 1

## 2023-02-25 ASSESSMENT — PAIN - FUNCTIONAL ASSESSMENT: PAIN_FUNCTIONAL_ASSESSMENT: 0-10

## 2023-02-25 ASSESSMENT — PAIN SCALES - GENERAL: PAINLEVEL_OUTOF10: 7

## 2023-02-25 ASSESSMENT — PAIN DESCRIPTION - LOCATION: LOCATION: THROAT;GENERALIZED

## 2023-02-25 NOTE — Clinical Note
Edi Rolon was seen and treated in our emergency department on 2/25/2023. He may return to work on 02/28/2023. If you have any questions or concerns, please don't hesitate to call.       Javed Nava MD

## 2023-02-25 NOTE — Clinical Note
Miranda Santana was seen and treated in our emergency department on 2/25/2023. He may return to work on . If you have any questions or concerns, please don't hesitate to call.       Mirna Blake MD

## 2023-02-25 NOTE — ED TRIAGE NOTES
A&O male with c/o non-productive cough, sore throat, body aches and fever x 2 days. Reports negative covid test at home.

## 2023-02-25 NOTE — Clinical Note
Segundo Amador was seen and treated in our emergency department on 2/25/2023. He may return to work on . If you have any questions or concerns, please don't hesitate to call.       Melania Castellon MD

## 2023-02-25 NOTE — ED NOTES
Medicated patient as ordered. Patient tolerated well. Patient prepared for discharge.      Letitia Craft RN  02/25/23 9106

## 2023-02-25 NOTE — Clinical Note
Parker Pascual was seen and treated in our emergency department on 2/25/2023. He may return to work on 02/28/2023. If you have any questions or concerns, please don't hesitate to call.       Sondra Lucas MD

## 2023-02-25 NOTE — ED NOTES
Reviewed discharge instructions with patient. Patient verbalized understanding. Patient provided with work  note as requested. Patient ambulated from ED treatment area independently.       Saqib Humphrey RN  02/25/23 2675

## 2023-02-25 NOTE — ED TRIAGE NOTES
`[facility] EMERGENCY DEPT  eMERGENCY dEPARTMENT eNCOUnter      Pt Name: Loretha Ganser  MRN: [id number]  Armselizabethgfmichelle 1975 of evaluation: 2/25/2023  Provider:James Lakhani [place], [address] [place] [address]       Chief Complaint   Patient presents with    Cough    Pharyngitis    Generalized Body Aches    Fever        HPI    Loretha Ganser is a 52 y.o. male per chart c/o having a non-productive cough, sore throat, body aches and fever x 2 days. Reports negative covid test at home. ROS  Review of Systems   Constitutional:  Positive for activity change. HENT:  Positive for sore throat. Negative for ear pain. Respiratory:  Positive for cough. Negative for shortness of breath. Cardiovascular:  Negative for chest pain and palpitations. Gastrointestinal:  Negative for abdominal pain. Genitourinary:  Negative for dysuria. Musculoskeletal:  Negative for arthralgias. Skin:  Negative for rash. Except as noted above the remainder of the review of systems was reviewed and negative. PAST MEDICAL HISTORY     Past Medical History:   Diagnosis Date    Genital herpes 3/2/2015    Genital herpes in men     Hypertension     Prediabetes          SURGICAL HISTORY       Past Surgical History:   Procedure Laterality Date    KNEE ARTHROSCOPY           CURRENTMEDICATIONS       Previous Medications    CLOBETASOL (TEMOVATE) 0.05 % EXTERNAL SOLUTION    Use bid    CLOTRIMAZOLE-BETAMETHASONE (LOTRISONE) 1-0.05 % CREAM    Use small amount bid until clear    DIAZEPAM (VALIUM) 5 MG TABLET    Take 1 tab by mouth 30 minutes prior to procedure.  May repeat x 1 if needed    LISINOPRIL-HYDROCHLOROTHIAZIDE (PRINZIDE;ZESTORETIC) 20-25 MG PER TABLET    Take 1 tablet by mouth daily    METOPROLOL SUCCINATE (TOPROL XL) 50 MG EXTENDED RELEASE TABLET    Take 50 mg by mouth daily    NAPROXEN SODIUM (ANAPROX) 550 MG TABLET    TAKE 1 TABLET BY MOUTH TWICE A DAY WITH MEALS    ORPHENADRINE (NORFLEX) 100 MG EXTENDED RELEASE TABLET    TAKE 1 TABLET BY MOUTH TWICE A DAY    TADALAFIL (CIALIS) 20 MG TABLET    Take 20 mg by mouth as needed    VALACYCLOVIR (VALTREX) 1 G TABLET    Take tid x7 days during outbreak. ALLERGIES     Patient has no known allergies. FAMILY HISTORY       Family History   Problem Relation Age of Onset    Diabetes Father     Hypertension Paternal Grandmother     High Cholesterol Paternal Grandmother     Cancer Paternal Grandmother     High Cholesterol Mother     Hypertension Mother     Cancer Paternal Grandfather         throat    High Cholesterol Father     Hypertension Father           SOCIAL HISTORY       Social History     Socioeconomic History    Marital status:      Spouse name: None    Number of children: None    Years of education: None    Highest education level: None   Tobacco Use    Smoking status: Never     Passive exposure: Never    Smokeless tobacco: Never   Substance and Sexual Activity    Alcohol use: No    Drug use: No         PHYSICAL EXAM       ED Triage Vitals [02/25/23 0426]   BP Temp Temp Source Heart Rate Resp SpO2 Height Weight   (!) 150/85 99.2 °F (37.3 °C) Temporal (!) 113 18 97 % 5' 8\" (1.727 m) 210 lb (95.3 kg)       Physical Exam  Vitals and nursing note reviewed. Constitutional:       General: He is not in acute distress. Appearance: Normal appearance. He is not ill-appearing, toxic-appearing or diaphoretic. HENT:      Head: Normocephalic and atraumatic. Right Ear: Tympanic membrane, ear canal and external ear normal. There is no impacted cerumen. Left Ear: Tympanic membrane, ear canal and external ear normal. There is no impacted cerumen. Nose: Nose normal. No congestion or rhinorrhea. Mouth/Throat:      Mouth: Mucous membranes are moist.      Pharynx: Oropharynx is clear. No oropharyngeal exudate or posterior oropharyngeal erythema. Eyes:      General:         Right eye: No discharge. Left eye: No discharge.    Cardiovascular:      Rate and Rhythm: Normal rate and regular rhythm. Pulses: Normal pulses. Heart sounds: Normal heart sounds. No murmur heard. No friction rub. No gallop. Pulmonary:      Effort: Pulmonary effort is normal. No respiratory distress. Breath sounds: Normal breath sounds. No stridor. No wheezing, rhonchi or rales. Chest:      Chest wall: No tenderness. Abdominal:      General: Abdomen is flat. Palpations: Abdomen is soft. Musculoskeletal:         General: No tenderness or signs of injury. Normal range of motion. Cervical back: Normal range of motion and neck supple. No tenderness. Lymphadenopathy:      Cervical: No cervical adenopathy. Skin:     General: Skin is warm and dry. Findings: No erythema or rash. Neurological:      General: No focal deficit present. Mental Status: He is alert and oriented to person, place, and time. Psychiatric:         Mood and Affect: Mood normal.         Behavior: Behavior normal.       No results found for this or any previous visit (from the past 24 hour(s)). XR KNEE RIGHT (MIN 4 VIEWS)    Result Date: 2/22/2023  Right  Knee -4 views COMPARISON: None CLINICAL HISTORY: Pain TECHNIQUE: 4 views right knee FINDINGS: Bony alignment is normal.  Joint spaces are maintained. Small osteophytes present along the medial and patellofemoral compartments. No acute fracture or dislocation. No joint effusion. No significant soft tissue abnormality. Mild degenerative changes in the knee. PROCEDURES:  Unless otherwise noted below, none     Procedures    EMERGENCY DEPARTMENT COURSE and DIFFERENTIALDIAGNOSIS/ MDM:   Vitals:    Vitals:    02/25/23 0426   BP: (!) 150/85   Pulse: (!) 113   Resp: 18   Temp: 99.2 °F (37.3 °C)   TempSrc: Temporal   SpO2: 97%   Weight: 210 lb (95.3 kg)   Height: 5' 8\" (1.727 m)       MDM    5:04 AM Upon re-evaluation the patient's symptoms have improved. Pt has non-toxic appearance and condition is stable for discharge.    Instructed to f/u with PCP and return to the ED upon worsening of symptoms. All questions and concerns were addressed. FINAL IMPRESSION      Acute bronchitis      DISPOSITION/PLAN     DISPOSITION : D/C home    DISCHARGE MEDICATIONS:    Amoxicillin   Robitussin AC  Tyenol for fever      PATIENT REFERRED TO:  PCP in 3 days. Return to ER pnrn    (Please note that portions of this note were completed with a voice recognitionprogram.  Efforts were made to edit the dictations but occasionally words are mis-transcribed.)    Luis Villalta.  Christian Ovalles MD(electronically signed)  Attending Emergency Physician

## 2023-02-25 NOTE — Clinical Note
Rima Dave was seen and treated in our emergency department on 2/25/2023. He may return to work on . If you have any questions or concerns, please don't hesitate to call.       Gopi Francois MD Your HCG level was 5872 today. You will need this repeated in 48hrs.

## 2023-03-03 ENCOUNTER — HOSPITAL ENCOUNTER (EMERGENCY)
Facility: HOSPITAL | Age: 48
Discharge: HOME | End: 2023-03-03
Attending: EMERGENCY MEDICINE
Payer: COMMERCIAL

## 2023-03-03 VITALS
HEIGHT: 68 IN | TEMPERATURE: 97.7 F | WEIGHT: 210 LBS | DIASTOLIC BLOOD PRESSURE: 77 MMHG | BODY MASS INDEX: 31.83 KG/M2 | HEART RATE: 92 BPM | RESPIRATION RATE: 18 BRPM | SYSTOLIC BLOOD PRESSURE: 145 MMHG | OXYGEN SATURATION: 98 %

## 2023-03-03 DIAGNOSIS — S01.312A LACERATION OF AURICLE OF LEFT EAR, INITIAL ENCOUNTER: Primary | ICD-10-CM

## 2023-03-03 PROCEDURE — 6360000002 HC RX W HCPCS: Performed by: EMERGENCY MEDICINE

## 2023-03-03 PROCEDURE — 12013 RPR F/E/E/N/L/M 2.6-5.0 CM: CPT

## 2023-03-03 PROCEDURE — 99284 EMERGENCY DEPT VISIT MOD MDM: CPT

## 2023-03-03 PROCEDURE — 90471 IMMUNIZATION ADMIN: CPT | Performed by: EMERGENCY MEDICINE

## 2023-03-03 PROCEDURE — 2500000003 HC RX 250 WO HCPCS: Performed by: EMERGENCY MEDICINE

## 2023-03-03 PROCEDURE — 90714 TD VACC NO PRESV 7 YRS+ IM: CPT | Performed by: EMERGENCY MEDICINE

## 2023-03-03 RX ORDER — LIDOCAINE HYDROCHLORIDE 10 MG/ML
10 INJECTION, SOLUTION EPIDURAL; INFILTRATION; INTRACAUDAL; PERINEURAL
Status: COMPLETED | OUTPATIENT
Start: 2023-03-03 | End: 2023-03-03

## 2023-03-03 RX ORDER — KETOCONAZOLE 20 MG/ML
SHAMPOO TOPICAL
COMMUNITY
Start: 2023-01-20

## 2023-03-03 RX ORDER — TETANUS AND DIPHTHERIA TOXOIDS ADSORBED 2; 2 [LF]/.5ML; [LF]/.5ML
0.5 INJECTION INTRAMUSCULAR ONCE
Status: COMPLETED | OUTPATIENT
Start: 2023-03-03 | End: 2023-03-03

## 2023-03-03 RX ADMIN — TETANUS AND DIPHTHERIA TOXOIDS ADSORBED 0.5 ML: 2; 2 INJECTION INTRAMUSCULAR at 08:04

## 2023-03-03 RX ADMIN — LIDOCAINE HYDROCHLORIDE 10 ML: 10 INJECTION, SOLUTION EPIDURAL; INFILTRATION; INTRACAUDAL; PERINEURAL at 09:45

## 2023-03-03 ASSESSMENT — LIFESTYLE VARIABLES
HOW OFTEN DO YOU HAVE A DRINK CONTAINING ALCOHOL: NEVER
HOW MANY STANDARD DRINKS CONTAINING ALCOHOL DO YOU HAVE ON A TYPICAL DAY: PATIENT DOES NOT DRINK

## 2023-03-03 NOTE — ED NOTES
Discharge instructions reviewed with patient. Patient verbalized understanding. Patient advised to follow up as directed on discharge instructions. Patient denies questions, needs or concerns at this time. Patient verbalized understanding. No s/sx of distress noted.         Nuris Rodríguez RN  03/03/23 1043

## 2023-03-03 NOTE — LETTER
[facility] EMERGENCY DEPT  [facility]  [address] [address]  Phone: [phone]               March 3, 2023    Patient: Leonard Mccarthy   YOB: 1975   Date of Visit: 3/3/2023       To Whom It May Concern:    Leonard Mccarthy was seen and treated in our emergency department on 3/3/2023.  He may return to work on 3/6/2023      Sincerely,       Valeri Bello RN         Signature:__________________________________

## 2023-03-03 NOTE — ED TRIAGE NOTES
Patient states Red Martins was getting something out of the cabinet this morning when some dishes fell and cut her left ear\".

## 2023-03-03 NOTE — ED PROVIDER NOTES
[address] [facility] EMERGENCY DEPT   3/3/23     Emergency Physician: Ted Nails MD  Initial Documentation: 8:06 AM EST     Patient: Rebeca Kaplan, 52 y.o. male     MRN: [id number]  : 1975    Location: [address]     Chief Complaint: Ear Laceration       HPI: The patient presents to the emergency department complaining of Head injury. The patient states that he was moving a shelf, and fell towards him. He was cut on his left ear by some heavy dishes when they fell off the shelf. Injury occurred immediately prior to arrival in the emergency department. Is unsure about his last tetanus status. No loss of consciousness. No other injuries. Review of Systems: 14 organ system review of systems is complete and is negative except as addressed in the HPI.     PCP: ANGEL Driscoll NP    Social History     Socioeconomic History    Marital status:      Spouse name: None    Number of children: None    Years of education: None    Highest education level: None   Tobacco Use    Smoking status: Never     Passive exposure: Never    Smokeless tobacco: Never   Vaping Use    Vaping Use: Never used   Substance and Sexual Activity    Alcohol use: No    Drug use: No    Sexual activity: Not Currently        Family History   Problem Relation Age of Onset    Diabetes Father     Hypertension Paternal Grandmother     High Cholesterol Paternal Grandmother     Cancer Paternal Grandmother     High Cholesterol Mother     Hypertension Mother     Cancer Paternal Grandfather         throat    High Cholesterol Father     Hypertension Father         Past Medical History:   Diagnosis Date    Genital herpes 3/2/2015    Genital herpes in men     Hypertension     Prediabetes         Past Surgical History:  No date: KNEE ARTHROSCOPY     Immunization History   Administered Date(s) Administered    Influenza Virus Vaccine 2016    Td (Adult), 2 Lf Tetanus Toxoid, Pf (Td, Absorbed) 2023       No Known Allergies    No current facility-administered medications for this encounter. Current Outpatient Medications   Medication Sig Dispense Refill    ketoconazole (NIZORAL) 2 % shampoo Use daily as needed until symptoms subside then discontinue      guaiFENesin-codeine (TUSSI-ORGANIDIN NR) 100-10 MG/5ML syrup Take 10 mLs by mouth 3 times daily as needed for Cough for up to 7 days. Max Daily Amount: 30 mLs 150 mL 0    lisinopril-hydroCHLOROthiazide (PRINZIDE;ZESTORETIC) 20-25 MG per tablet Take 1 tablet by mouth daily      metoprolol succinate (TOPROL XL) 50 MG extended release tablet Take 50 mg by mouth daily          Physical Exam:Body mass index is 31.93 kg/m². Vitals:    03/03/23 0736   BP: (!) 145/77   Pulse: 92   Resp: 18   Temp: 97.7 °F (36.5 °C)   SpO2: 98%       General: Well developed, well nourished, alert, appears stated age  [de-identified]: Normocephalic, Laceration into the superior aspect of the left ear. atraumatic. No scleral icterus. Extraocular movements intact. Normal mucous membranes. Uvula midline. Airway widely patent. Respiratory: No accessory muscle use. Cardiovascular: . No edema. Gastrointestinal: Non distended  Musculoskeletal: Full range of motion at all other tested joints. Neurological: Normal speech. No ataxia. Skin: No rash, petechia or purpura. Warm and dry  Psychiatric: Normal mood. normal affect. Heme: No lymphadenopathy.    : Deferred          EKG:     Imaging:   No orders to display     Labs: Labs Reviewed - No data to display  ED Medication Orders (From admission, onward)      Start Ordered     Status Ordering Provider    03/03/23 1000 03/03/23 0941  lidocaine PF 1 % injection 10 mL  NOW         Last MAR action: Given - by Jitendra Packer on 03/03/23 at [address]    03/03/23 0815 03/03/23 0755  diptheria-tetanus toxoids (TDVAX) 2-2 LF/0.5ML injection 0.5 mL  ONCE         Last MAR action: Given - by Purvi Suh on 03/03/23 at 0401 [facility] ______________________________________________________________________  Medical Decision Making:  10:26 AM: After appropriate verbal consent was obtained, the patient's wound was anesthetized with 1% lidocaine without epinephrine. Good analgesia was achieved. The patient's wound was thoroughly evaluated. He does have a laceration through the pinna, and involving the cartilage. Given the risks of infection and complications, I think the most prudent thing to do is to have encouraged Outpatient follow-up with ENT ASAP. The patient's wound edges were reapproximated with 9 simple interrupted 3-0 Ethilon sutures. The patient tolerated the procedure well. The total laceration length was 3.5 cm. DIAGNOSIS:   1. Laceration of auricle of left ear, initial encounter        PRESCRIPTIONS:   New Prescriptions    No medications on file       DISPOSITION Decision To Discharge 03/03/2023 10:29:04 AM     FOLLOW-UP: ANGEL Rice NP    This chart was completed using  an electronic medical record and dictation software.   It may contain unedited transcription errors           Fina Nails MD  03/03/23 7637

## 2023-03-03 NOTE — ED NOTES
Cleaned patient left ear with wound  and visual inspection of left ear, the top of the Helix on the left ear has an approx 2 inch laceration that continues to the side of the ear on the head. Patient tolerated procedure well. Wound is not bleeding at this time. Provider made aware that ear has been cleaned with wound care cart at bedside awaiting provider.      Flako Alford RN  03/03/23 0884

## 2023-03-21 ENCOUNTER — TELEPHONE (OUTPATIENT)
Age: 48
End: 2023-03-21

## 2023-04-05 ENCOUNTER — TELEPHONE (OUTPATIENT)
Age: 48
End: 2023-04-05

## 2023-04-05 NOTE — TELEPHONE ENCOUNTER
----- Message from [employer] sent at 4/4/2023  1:18 PM EDT -----  Subject: Message to Provider    QUESTIONS  Information for Provider? PT is requesting a stitches removal on his left   ear. Please contact PT to book the appt at [phone]  ---------------------------------------------------------------------------  --------------  [address] [id number]  [phone]; OK to leave message on voicemail  ---------------------------------------------------------------------------  --------------  SCRIPT ANSWERS  Relationship to Patient? Self  (Is the patient requesting to see the provider for a procedure?)?  Yes

## 2023-04-05 NOTE — TELEPHONE ENCOUNTER
MARY Singh reviewed patient ER visit note. Informed patient ER records state he was to follow up with Otolaryngology as soon as possible. Patient stated he does not have the paperwork from the hospital with the providers information.  Gave patient the following information listed in the ER notes:    Dr Nakia Spann  [phone]  [address] [place] [address],[address]  [place], [place]

## 2023-05-13 ENCOUNTER — HOSPITAL ENCOUNTER (EMERGENCY)
Facility: HOSPITAL | Age: 48
Discharge: HOME | End: 2023-05-14
Attending: EMERGENCY MEDICINE
Payer: COMMERCIAL

## 2023-05-13 DIAGNOSIS — F12.920 CANNABIS INTOXICATION WITHOUT COMPLICATION (HCC): Primary | ICD-10-CM

## 2023-05-13 LAB
ALBUMIN SERPL-MCNC: 4.1 G/DL (ref 3.4–5)
ALBUMIN/GLOB SERPL: 1.2 (ref 0.8–1.7)
ALP SERPL-CCNC: 79 U/L (ref 45–117)
ALT SERPL-CCNC: 32 U/L (ref 16–61)
ANION GAP SERPL CALC-SCNC: 4 MMOL/L (ref 3–18)
AST SERPL-CCNC: 27 U/L (ref 10–38)
BASOPHILS # BLD: 0.1 K/UL (ref 0–0.1)
BASOPHILS NFR BLD: 1 % (ref 0–2)
BILIRUB SERPL-MCNC: 0.8 MG/DL (ref 0.2–1)
BUN SERPL-MCNC: 12 MG/DL (ref 7–18)
BUN/CREAT SERPL: 9 (ref 12–20)
CALCIUM SERPL-MCNC: 8.5 MG/DL (ref 8.5–10.1)
CHLORIDE SERPL-SCNC: 106 MMOL/L (ref 100–111)
CO2 SERPL-SCNC: 27 MMOL/L (ref 21–32)
CREAT SERPL-MCNC: 1.32 MG/DL (ref 0.6–1.3)
DIFFERENTIAL METHOD BLD: ABNORMAL
EOSINOPHIL # BLD: 0.1 K/UL (ref 0–0.4)
EOSINOPHIL NFR BLD: 1 % (ref 0–5)
ERYTHROCYTE [DISTWIDTH] IN BLOOD BY AUTOMATED COUNT: 11.7 % (ref 11.6–14.5)
GLOBULIN SER CALC-MCNC: 3.4 G/DL (ref 2–4)
GLUCOSE SERPL-MCNC: 199 MG/DL (ref 74–99)
HCT VFR BLD AUTO: 39.6 % (ref 36–48)
HGB BLD-MCNC: 14.1 G/DL (ref 13–16)
IMM GRANULOCYTES # BLD AUTO: 0 K/UL (ref 0–0.04)
IMM GRANULOCYTES NFR BLD AUTO: 0 % (ref 0–0.5)
LYMPHOCYTES # BLD: 1.9 K/UL (ref 0.9–3.6)
LYMPHOCYTES NFR BLD: 34 % (ref 21–52)
MCH RBC QN AUTO: 33.1 PG (ref 24–34)
MCHC RBC AUTO-ENTMCNC: 35.6 G/DL (ref 31–37)
MCV RBC AUTO: 93 FL (ref 78–100)
MONOCYTES # BLD: 0.4 K/UL (ref 0.05–1.2)
MONOCYTES NFR BLD: 7 % (ref 3–10)
NEUTS SEG # BLD: 3.1 K/UL (ref 1.8–8)
NEUTS SEG NFR BLD: 56 % (ref 40–73)
NRBC # BLD: 0 K/UL (ref 0–0.01)
NRBC BLD-RTO: 0 PER 100 WBC
PLATELET # BLD AUTO: 201 K/UL (ref 135–420)
PMV BLD AUTO: 10.7 FL (ref 9.2–11.8)
POTASSIUM SERPL-SCNC: 3.2 MMOL/L (ref 3.5–5.5)
PROT SERPL-MCNC: 7.5 G/DL (ref 6.4–8.2)
RBC # BLD AUTO: 4.26 M/UL (ref 4.35–5.65)
SODIUM SERPL-SCNC: 137 MMOL/L (ref 136–145)
WBC # BLD AUTO: 5.5 K/UL (ref 4.6–13.2)

## 2023-05-13 PROCEDURE — 85025 COMPLETE CBC W/AUTO DIFF WBC: CPT

## 2023-05-13 PROCEDURE — 96361 HYDRATE IV INFUSION ADD-ON: CPT

## 2023-05-13 PROCEDURE — 99284 EMERGENCY DEPT VISIT MOD MDM: CPT

## 2023-05-13 PROCEDURE — 93005 ELECTROCARDIOGRAM TRACING: CPT | Performed by: EMERGENCY MEDICINE

## 2023-05-13 PROCEDURE — 96360 HYDRATION IV INFUSION INIT: CPT

## 2023-05-13 PROCEDURE — 80053 COMPREHEN METABOLIC PANEL: CPT

## 2023-05-13 PROCEDURE — 2580000003 HC RX 258: Performed by: EMERGENCY MEDICINE

## 2023-05-13 RX ORDER — SODIUM CHLORIDE 9 MG/ML
INJECTION, SOLUTION INTRAVENOUS CONTINUOUS
Status: DISCONTINUED | OUTPATIENT
Start: 2023-05-13 | End: 2023-05-14 | Stop reason: HOSPADM

## 2023-05-13 RX ORDER — 0.9 % SODIUM CHLORIDE 0.9 %
200 INTRAVENOUS SOLUTION INTRAVENOUS ONCE
Status: COMPLETED | OUTPATIENT
Start: 2023-05-13 | End: 2023-05-13

## 2023-05-13 RX ADMIN — SODIUM CHLORIDE 200 ML: 9 INJECTION, SOLUTION INTRAVENOUS at 22:52

## 2023-05-13 RX ADMIN — SODIUM CHLORIDE: 9 INJECTION, SOLUTION INTRAVENOUS at 22:53

## 2023-05-14 VITALS
OXYGEN SATURATION: 99 % | DIASTOLIC BLOOD PRESSURE: 66 MMHG | SYSTOLIC BLOOD PRESSURE: 131 MMHG | RESPIRATION RATE: 15 BRPM | HEART RATE: 85 BPM | TEMPERATURE: 97.1 F

## 2023-05-14 LAB
EKG ATRIAL RATE: 86 BPM
EKG DIAGNOSIS: NORMAL
EKG P AXIS: 46 DEGREES
EKG P-R INTERVAL: 170 MS
EKG Q-T INTERVAL: 382 MS
EKG QRS DURATION: 104 MS
EKG QTC CALCULATION (BAZETT): 457 MS
EKG R AXIS: 42 DEGREES
EKG T AXIS: 57 DEGREES
EKG VENTRICULAR RATE: 86 BPM

## 2023-05-14 PROCEDURE — 6370000000 HC RX 637 (ALT 250 FOR IP): Performed by: EMERGENCY MEDICINE

## 2023-05-14 PROCEDURE — 93010 ELECTROCARDIOGRAM REPORT: CPT | Performed by: INTERNAL MEDICINE

## 2023-05-14 RX ORDER — POTASSIUM CHLORIDE 20 MEQ/1
40 TABLET, EXTENDED RELEASE ORAL
Status: COMPLETED | OUTPATIENT
Start: 2023-05-14 | End: 2023-05-14

## 2023-05-14 RX ADMIN — POTASSIUM CHLORIDE 40 MEQ: 1500 TABLET, EXTENDED RELEASE ORAL at 04:26

## 2023-05-14 ASSESSMENT — ENCOUNTER SYMPTOMS
RESPIRATORY NEGATIVE: 1
GASTROINTESTINAL NEGATIVE: 1

## 2023-12-26 NOTE — TELEPHONE ENCOUNTER
Last Visit: 04- OV   Next Appointment: none  Previous Refill Encounter: 01- #90 tabs with 1 refills      Requested Prescriptions     Pending Prescriptions Disp Refills    metoprolol succinate (TOPROL XL) 50 MG extended release tablet [Pharmacy Med Name: METOPROLOL SUCC ER 50 MG TAB] 90 tablet      Sig: take 1 tablet by mouth once daily

## 2023-12-27 RX ORDER — METOPROLOL SUCCINATE 50 MG/1
50 TABLET, EXTENDED RELEASE ORAL DAILY
Qty: 90 TABLET | OUTPATIENT
Start: 2023-12-27

## 2023-12-28 NOTE — TELEPHONE ENCOUNTER
Last Visit: 4/14/23   Next Appointment: None    Requested Prescriptions     Pending Prescriptions Disp Refills    lisinopril-hydroCHLOROthiazide (PRINZIDE;ZESTORETIC) 20-25 MG per tablet [Pharmacy Med Name: LISINOPRIL-HCTZ 20-25 MG TAB] 90 tablet      Sig: take 1 tablet by mouth once daily

## 2023-12-29 ENCOUNTER — TELEMEDICINE (OUTPATIENT)
Age: 48
End: 2023-12-29
Payer: COMMERCIAL

## 2023-12-29 DIAGNOSIS — Z13.220 SCREENING FOR CHOLESTEROL LEVEL: ICD-10-CM

## 2023-12-29 DIAGNOSIS — I10 ESSENTIAL (PRIMARY) HYPERTENSION: ICD-10-CM

## 2023-12-29 DIAGNOSIS — G89.29 CHRONIC PAIN OF RIGHT KNEE: ICD-10-CM

## 2023-12-29 DIAGNOSIS — F32.A ANXIETY AND DEPRESSION: Primary | ICD-10-CM

## 2023-12-29 DIAGNOSIS — Z13.1 SCREENING FOR DIABETES MELLITUS: ICD-10-CM

## 2023-12-29 DIAGNOSIS — F41.9 ANXIETY AND DEPRESSION: Primary | ICD-10-CM

## 2023-12-29 DIAGNOSIS — M25.561 CHRONIC PAIN OF RIGHT KNEE: ICD-10-CM

## 2023-12-29 PROCEDURE — 99214 OFFICE O/P EST MOD 30 MIN: CPT | Performed by: NURSE PRACTITIONER

## 2023-12-29 RX ORDER — BUSPIRONE HYDROCHLORIDE 10 MG/1
10 TABLET ORAL 2 TIMES DAILY
Qty: 60 TABLET | Refills: 1 | Status: SHIPPED | OUTPATIENT
Start: 2023-12-29

## 2023-12-29 RX ORDER — METOPROLOL SUCCINATE 50 MG/1
50 TABLET, EXTENDED RELEASE ORAL DAILY
Qty: 60 TABLET | Refills: 0 | Status: SHIPPED | OUTPATIENT
Start: 2023-12-29

## 2023-12-29 RX ORDER — SENNOSIDES 8.6 MG
650 CAPSULE ORAL EVERY 8 HOURS PRN
Qty: 60 TABLET | Refills: 3 | Status: SHIPPED | OUTPATIENT
Start: 2023-12-29

## 2023-12-29 RX ORDER — LISINOPRIL AND HYDROCHLOROTHIAZIDE 25; 20 MG/1; MG/1
1 TABLET ORAL DAILY
Qty: 60 TABLET | Refills: 0 | Status: SHIPPED | OUTPATIENT
Start: 2023-12-29

## 2023-12-29 RX ORDER — ESCITALOPRAM OXALATE 10 MG/1
10 TABLET ORAL DAILY
Qty: 90 TABLET | Refills: 1 | Status: SHIPPED | OUTPATIENT
Start: 2023-12-29

## 2023-12-29 RX ORDER — LISINOPRIL AND HYDROCHLOROTHIAZIDE 25; 20 MG/1; MG/1
1 TABLET ORAL DAILY
Qty: 90 TABLET | OUTPATIENT
Start: 2023-12-29

## 2023-12-29 ASSESSMENT — ANXIETY QUESTIONNAIRES
IF YOU CHECKED OFF ANY PROBLEMS ON THIS QUESTIONNAIRE, HOW DIFFICULT HAVE THESE PROBLEMS MADE IT FOR YOU TO DO YOUR WORK, TAKE CARE OF THINGS AT HOME, OR GET ALONG WITH OTHER PEOPLE: EXTREMELY DIFFICULT
1. FEELING NERVOUS, ANXIOUS, OR ON EDGE: 3
2. NOT BEING ABLE TO STOP OR CONTROL WORRYING: 3
7. FEELING AFRAID AS IF SOMETHING AWFUL MIGHT HAPPEN: 3
6. BECOMING EASILY ANNOYED OR IRRITABLE: 2
GAD7 TOTAL SCORE: 18
3. WORRYING TOO MUCH ABOUT DIFFERENT THINGS: 3
4. TROUBLE RELAXING: 3
5. BEING SO RESTLESS THAT IT IS HARD TO SIT STILL: 1

## 2023-12-29 ASSESSMENT — PATIENT HEALTH QUESTIONNAIRE - PHQ9
6. FEELING BAD ABOUT YOURSELF - OR THAT YOU ARE A FAILURE OR HAVE LET YOURSELF OR YOUR FAMILY DOWN: 3
SUM OF ALL RESPONSES TO PHQ9 QUESTIONS 1 & 2: 6
5. POOR APPETITE OR OVEREATING: 3
SUM OF ALL RESPONSES TO PHQ QUESTIONS 1-9: 18
10. IF YOU CHECKED OFF ANY PROBLEMS, HOW DIFFICULT HAVE THESE PROBLEMS MADE IT FOR YOU TO DO YOUR WORK, TAKE CARE OF THINGS AT HOME, OR GET ALONG WITH OTHER PEOPLE: 3
4. FEELING TIRED OR HAVING LITTLE ENERGY: 2
SUM OF ALL RESPONSES TO PHQ QUESTIONS 1-9: 18
1. LITTLE INTEREST OR PLEASURE IN DOING THINGS: 3
9. THOUGHTS THAT YOU WOULD BE BETTER OFF DEAD, OR OF HURTING YOURSELF: 0
8. MOVING OR SPEAKING SO SLOWLY THAT OTHER PEOPLE COULD HAVE NOTICED. OR THE OPPOSITE, BEING SO FIGETY OR RESTLESS THAT YOU HAVE BEEN MOVING AROUND A LOT MORE THAN USUAL: 0
7. TROUBLE CONCENTRATING ON THINGS, SUCH AS READING THE NEWSPAPER OR WATCHING TELEVISION: 3
2. FEELING DOWN, DEPRESSED OR HOPELESS: 3
3. TROUBLE FALLING OR STAYING ASLEEP: 1

## 2023-12-29 NOTE — PROGRESS NOTES
Amador Salgado, was evaluated through a synchronous (real-time) audio-video encounter. The patient (or guardian if applicable) is aware that this is a billable service, which includes applicable co-pays. This Virtual Visit was conducted with patient's (and/or legal guardian's) consent. Patient identification was verified, and a caregiver was present when appropriate.   The patient was located at Home: [address] [place] [address]  Provider was located at Facility (Appt Dept): [address] [facility] [address]  [place] [address]      Amador Salgado (:  1975) is a Established patient, presenting virtually for evaluation of the following: knee pain, anxiety      --Shavonne Escobar MA

## 2023-12-29 NOTE — PROGRESS NOTES
Virtual Visit    Assessment/Plan:   Below is the assessment and plan developed based on review of pertinent history, physical exam, labs, studies, and medications.    Amador was seen today for knee pain and anxiety.    Diagnoses and all orders for this visit:    Anxiety and depression  -     escitalopram (LEXAPRO) 10 MG tablet; Take 1 tablet by mouth daily  -     busPIRone (BUSPAR) 10 MG tablet; Take 1 tablet by mouth 2 times daily    Chronic pain of right knee  -     [facility] [place] [facility] [place]  -     acetaminophen (TYLENOL 8 HOUR) 650 MG extended release tablet; Take 1 tablet by mouth every 8 hours as needed for Pain    Essential (primary) hypertension  -     Comprehensive Metabolic Panel; Future  -     lisinopril-hydroCHLOROthiazide (PRINZIDE;ZESTORETIC) 20-25 MG per tablet; Take 1 tablet by mouth daily  -     metoprolol succinate (TOPROL XL) 50 MG extended release tablet; Take 1 tablet by mouth daily    Screening for cholesterol level  -     Lipid Panel; Future    Screening for diabetes mellitus  -     Hemoglobin A1C; Future       Follow-up and Dispositions    Return in about 6 weeks (around 2/9/2024) for Knee Pain, Anxiety and Depression, HTN.         Subjective:     Amador is a 48 y.o. y.o. male who complains of   Chief Complaint   Patient presents with    Knee Pain    Anxiety      Depression and Anxiety Review:    Patient is seen for depression and anxiety disorder. Current treatment includes  N/A  and none. Ongoing depressive symptoms include depressed mood, difficulty concentrating, fatigue, feelings of worthlessness/guilt, hopelessness, and psychomotor agitation.Ongoing anxiety symptoms include: difficulty concentrating, fatigue, feelings of losing control, irritable, psychomotor agitation, racing thoughts.   Patient denies:  recurrent thoughts of death and  none.   Reported side effects from the treatment: none.   Triggers include: Pt states his symptoms stem fro situations

## 2024-01-04 ENCOUNTER — TELEMEDICINE (OUTPATIENT)
Age: 49
End: 2024-01-04
Payer: COMMERCIAL

## 2024-01-04 DIAGNOSIS — U07.1 COVID-19: ICD-10-CM

## 2024-01-04 DIAGNOSIS — J06.9 VIRAL URI: Primary | ICD-10-CM

## 2024-01-04 PROCEDURE — 99214 OFFICE O/P EST MOD 30 MIN: CPT | Performed by: NURSE PRACTITIONER

## 2024-01-04 RX ORDER — CODEINE PHOSPHATE/GUAIFENESIN 10-100MG/5
5 LIQUID (ML) ORAL 4 TIMES DAILY PRN
Qty: 140 ML | Refills: 0 | Status: SHIPPED | OUTPATIENT
Start: 2024-01-04 | End: 2024-01-11

## 2024-01-04 RX ORDER — ALBUTEROL SULFATE 90 UG/1
2 AEROSOL, METERED RESPIRATORY (INHALATION) 4 TIMES DAILY PRN
Qty: 1 EACH | Refills: 0 | Status: SHIPPED | OUTPATIENT
Start: 2024-01-04

## 2024-01-04 ASSESSMENT — PATIENT HEALTH QUESTIONNAIRE - PHQ9
SUM OF ALL RESPONSES TO PHQ QUESTIONS 1-9: 0
1. LITTLE INTEREST OR PLEASURE IN DOING THINGS: 0
SUM OF ALL RESPONSES TO PHQ QUESTIONS 1-9: 0
SUM OF ALL RESPONSES TO PHQ9 QUESTIONS 1 & 2: 0
2. FEELING DOWN, DEPRESSED OR HOPELESS: 0

## 2024-01-04 NOTE — PROGRESS NOTES
Amador Salgado, was evaluated through a synchronous (real-time) audio-video encounter. The patient (or guardian if applicable) is aware that this is a billable service, which includes applicable co-pays. This Virtual Visit was conducted with patient's (and/or legal guardian's) consent. Patient identification was verified, and a caregiver was present when appropriate.   The patient was located at Home: [address] [place] [id number]  Provider was located at Facility (Appt Dept): [address] , [facility] [address]  [place] [address]      Amador Salgado (:  1975) is a Established patient, presenting virtually for evaluation of the following: cough, congestion, body aches      --Shavonne Escobar MA         
contacts: no.     Pertinent Medical History: None.     COVID-19 vaccination status: No,  Flu Vaccine Status: not received this season     Objective:     Patient-Reported Vitals  No data recorded          Amador Salgado, was evaluated through a synchronous (real-time) audio-video encounter. The patient (or guardian if applicable) is aware that this is a billable service, which includes applicable co-pays. This Virtual Visit was conducted with patient's (and/or legal guardian's) consent. Patient identification was verified, and a caregiver was present when appropriate.   The patient was located at Home: [address] [place] [id number]  Provider was located at Facility (Appt Dept): [address], [facility] [address]  [place] [address]       Disclaimer:    The patient understands our medical plan.  Alternatives have been explained and offered.  The risks, benefits and significant side effects of all medications have been reviewed. Anticipated time course and progression of condition reviewed. All questions have been addressed.  He is encouraged to employ the information provided in the after visit summary, which was reviewed.      Where applicable, he is instructed to call the clinic if he has not been notified either by phone or through [employer] with the results of his tests/labs or with an appointment plan for any referrals within 1 week(s).  The patient is to call if his condition worsens or fails to improve or if significant side effects are experienced.     Aspects of this note may have been generated using voice recognition software. Despite editing, there may be unrecognized errors.      Jose Luis Chao, APRN - NP   1/9/2024

## 2024-01-04 NOTE — PATIENT INSTRUCTIONS
URI/Cold/Flu/COVID at home treatment for high blood pressure/diabetes  To prevent dehydration, drink plenty of fluids, enough so that your urine is light yellow or clear like water.   Take acetaminophen (Tylenol) for fever or pain.   Take cough medicine (Diabetic Tussin, Coricidin HBP Products)  Get plenty of rest.  Use saline (saltwater) nasal washes to help keep your nasal passages open and wash out mucus and bacteria.   You can also use nasal sprays (Flonase, Nasonex, Nasonex, Nasacort)  Use a vaporizer or humidifier to add moisture to the air in your bedroom.   Do not smoke or allow others to smoke around you.  May take an allergy medication (allegra, Claritin, Zyrtec) to help with congestion, and runny nose, itchy eyes, and ear pressure, and congestion.

## 2024-02-17 DIAGNOSIS — I10 ESSENTIAL (PRIMARY) HYPERTENSION: ICD-10-CM

## 2024-02-19 RX ORDER — LISINOPRIL AND HYDROCHLOROTHIAZIDE 25; 20 MG/1; MG/1
1 TABLET ORAL DAILY
Qty: 30 TABLET | Refills: 0 | Status: SHIPPED | OUTPATIENT
Start: 2024-02-19

## 2024-02-19 RX ORDER — METOPROLOL SUCCINATE 50 MG/1
50 TABLET, EXTENDED RELEASE ORAL DAILY
Qty: 30 TABLET | Refills: 0 | Status: SHIPPED | OUTPATIENT
Start: 2024-02-19

## 2024-02-19 NOTE — TELEPHONE ENCOUNTER
Last Visit: 12/29/23 VV   Next Appointment: None    Requested Prescriptions     Pending Prescriptions Disp Refills    lisinopril-hydroCHLOROthiazide (PRINZIDE;ZESTORETIC) 20-25 MG per tablet [Pharmacy Med Name: LISINOPRIL-HCTZ 20-25 MG TAB] 60 tablet 0     Sig: TAKE 1 TABLET BY MOUTH ONCE DAILY    metoprolol succinate (TOPROL XL) 50 MG extended release tablet [Pharmacy Med Name: METOPROLOL SUCC ER 50 MG TAB] 60 tablet 0     Sig: take 1 tablet by mouth once daily

## 2024-04-29 RX ORDER — KETOCONAZOLE 20 MG/ML
SHAMPOO TOPICAL
Qty: 120 ML | Refills: 3 | Status: SHIPPED | OUTPATIENT
Start: 2024-04-29

## 2024-10-08 ENCOUNTER — OFFICE VISIT (OUTPATIENT)
Facility: CLINIC | Age: 49
End: 2024-10-08

## 2024-10-08 VITALS
SYSTOLIC BLOOD PRESSURE: 128 MMHG | DIASTOLIC BLOOD PRESSURE: 85 MMHG | TEMPERATURE: 98.2 F | OXYGEN SATURATION: 96 % | BODY MASS INDEX: 33.34 KG/M2 | WEIGHT: 220 LBS | HEART RATE: 76 BPM | HEIGHT: 68 IN | RESPIRATION RATE: 16 BRPM

## 2024-10-08 DIAGNOSIS — Z13.29 SCREENING FOR THYROID DISORDER: ICD-10-CM

## 2024-10-08 DIAGNOSIS — Z13.220 SCREENING FOR CHOLESTEROL LEVEL: ICD-10-CM

## 2024-10-08 DIAGNOSIS — Z13.0 SCREENING FOR DEFICIENCY ANEMIA: ICD-10-CM

## 2024-10-08 DIAGNOSIS — Z13.1 SCREENING FOR DIABETES MELLITUS: ICD-10-CM

## 2024-10-08 DIAGNOSIS — I10 ESSENTIAL (PRIMARY) HYPERTENSION: Primary | ICD-10-CM

## 2024-10-08 DIAGNOSIS — H60.501 ACUTE OTITIS EXTERNA OF RIGHT EAR, UNSPECIFIED TYPE: ICD-10-CM

## 2024-10-08 DIAGNOSIS — I10 ESSENTIAL (PRIMARY) HYPERTENSION: ICD-10-CM

## 2024-10-08 DIAGNOSIS — L30.9 DERMATITIS, UNSPECIFIED: ICD-10-CM

## 2024-10-08 RX ORDER — METOPROLOL SUCCINATE 50 MG/1
50 TABLET, EXTENDED RELEASE ORAL DAILY
Qty: 30 TABLET | Refills: 0 | Status: SHIPPED | OUTPATIENT
Start: 2024-10-08 | End: 2024-10-08 | Stop reason: SDUPTHER

## 2024-10-08 RX ORDER — LISINOPRIL AND HYDROCHLOROTHIAZIDE 20; 25 MG/1; MG/1
1 TABLET ORAL DAILY
Qty: 30 TABLET | Refills: 0 | Status: SHIPPED | OUTPATIENT
Start: 2024-10-08 | End: 2024-10-08 | Stop reason: SDUPTHER

## 2024-10-08 RX ORDER — KETOCONAZOLE 20 MG/ML
SHAMPOO TOPICAL
Qty: 120 ML | Refills: 3 | Status: SHIPPED | OUTPATIENT
Start: 2024-10-08

## 2024-10-08 RX ORDER — LISINOPRIL AND HYDROCHLOROTHIAZIDE 20; 25 MG/1; MG/1
1 TABLET ORAL DAILY
Qty: 90 TABLET | Refills: 1 | Status: SHIPPED | OUTPATIENT
Start: 2024-10-08

## 2024-10-08 RX ORDER — METOPROLOL SUCCINATE 50 MG/1
50 TABLET, EXTENDED RELEASE ORAL DAILY
Qty: 90 TABLET | Refills: 1 | Status: SHIPPED | OUTPATIENT
Start: 2024-10-08

## 2024-10-08 RX ORDER — CIPROFLOXACIN AND DEXAMETHASONE 3; 1 MG/ML; MG/ML
4 SUSPENSION/ DROPS AURICULAR (OTIC) 2 TIMES DAILY
Qty: 7.5 ML | Refills: 0 | Status: SHIPPED | OUTPATIENT
Start: 2024-10-08 | End: 2024-10-15

## 2024-10-08 NOTE — PROGRESS NOTES
Hypertension    Assessment/Plan:     hypertension needs further observation and needs to follow diet more regularly.    Lab results and schedule of future lab studies reviewed with patient.  Repeat labs ordered prior to next appointment.  Orders and follow up as documented in patient record.  Reviewed diet, exercise and weight control.  Recommended sodium restriction.  Copy of written low fat low cholesterol diet provided and reviewed.  Cardiovascular risk and specific lipid/LDL goals reviewed.  Reviewed medications and side effects in detail.  Reviewed potential future medication changes and side effects..     1. Essential (primary) hypertension  -     Comprehensive Metabolic Panel; Future  -     lisinopril-hydroCHLOROthiazide (PRINZIDE;ZESTORETIC) 20-25 MG per tablet; Take 1 tablet by mouth daily, Disp-90 tablet, R-1Normal  -     metoprolol succinate (TOPROL XL) 50 MG extended release tablet; Take 1 tablet by mouth daily, Disp-90 tablet, R-1Normal  2. Screening for cholesterol level  -     Lipid Panel; Future  3. Screening for diabetes mellitus  -     Hemoglobin A1C; Future  4. Dermatitis, unspecified  -     ketoconazole (NIZORAL) 2 % shampoo; Apply topically daily as needed., Disp-120 mL, R-3, Normal  5. Acute otitis externa of right ear, unspecified type  -     ciprofloxacin-dexAMETHasone (CIPRODEX) 0.3-0.1 % otic suspension; Place 4 drops into the right ear 2 times daily for 7 days, Disp-7.5 mL, R-0Normal  6. Screening for deficiency anemia  -     CBC; Future  7. Screening for thyroid disorder  -     TSH; Future     Return in about 6 months (around 4/8/2025) for HTN, In Office (ONLY).   Subjective:     Amador Salgado is a 49 y.o. Black /  male who presents for follow-up of hypertension. Pt denies any SOB, edema, CP, orthopnea, palpitations, nocturnal dyspnea, headaches, or blurred vision.     Diet and Lifestyle: not attempting to follow a low fat, low cholesterol diet, not attempting to follow a

## 2024-10-09 LAB
ALBUMIN SERPL-MCNC: 4.7 G/DL (ref 4.1–5.1)
ALP SERPL-CCNC: 74 IU/L (ref 44–121)
ALT SERPL-CCNC: 26 IU/L (ref 0–44)
AST SERPL-CCNC: 27 IU/L (ref 0–40)
BILIRUB SERPL-MCNC: 0.6 MG/DL (ref 0–1.2)
BUN SERPL-MCNC: 13 MG/DL (ref 6–24)
BUN/CREAT SERPL: 10 (ref 9–20)
CALCIUM SERPL-MCNC: 9.7 MG/DL (ref 8.7–10.2)
CHLORIDE SERPL-SCNC: 101 MMOL/L (ref 96–106)
CHOLEST SERPL-MCNC: 198 MG/DL (ref 100–199)
CO2 SERPL-SCNC: 22 MMOL/L (ref 20–29)
CREAT SERPL-MCNC: 1.33 MG/DL (ref 0.76–1.27)
EGFRCR SERPLBLD CKD-EPI 2021: 66 ML/MIN/1.73
ERYTHROCYTE [DISTWIDTH] IN BLOOD BY AUTOMATED COUNT: 11.7 % (ref 11.6–15.4)
GLOBULIN SER CALC-MCNC: 2.5 G/DL (ref 1.5–4.5)
GLUCOSE SERPL-MCNC: 105 MG/DL (ref 70–99)
HBA1C MFR BLD: 5.2 % (ref 4.8–5.6)
HCT VFR BLD AUTO: 42.7 % (ref 37.5–51)
HDLC SERPL-MCNC: 40 MG/DL
HGB BLD-MCNC: 14.2 G/DL (ref 13–17.7)
LDLC SERPL CALC-MCNC: 141 MG/DL (ref 0–99)
MCH RBC QN AUTO: 32.8 PG (ref 26.6–33)
MCHC RBC AUTO-ENTMCNC: 33.3 G/DL (ref 31.5–35.7)
MCV RBC AUTO: 99 FL (ref 79–97)
PLATELET # BLD AUTO: 191 X10E3/UL (ref 150–450)
POTASSIUM SERPL-SCNC: 4.5 MMOL/L (ref 3.5–5.2)
PROT SERPL-MCNC: 7.2 G/DL (ref 6–8.5)
RBC # BLD AUTO: 4.33 X10E6/UL (ref 4.14–5.8)
SODIUM SERPL-SCNC: 139 MMOL/L (ref 134–144)
TRIGL SERPL-MCNC: 95 MG/DL (ref 0–149)
TSH SERPL DL<=0.005 MIU/L-ACNC: 0.93 UIU/ML (ref 0.45–4.5)
VLDLC SERPL CALC-MCNC: 17 MG/DL (ref 5–40)
WBC # BLD AUTO: 5 X10E3/UL (ref 3.4–10.8)

## 2024-12-23 ENCOUNTER — OFFICE VISIT (OUTPATIENT)
Facility: CLINIC | Age: 49
End: 2024-12-23
Payer: COMMERCIAL

## 2024-12-23 VITALS
RESPIRATION RATE: 18 BRPM | HEIGHT: 68 IN | TEMPERATURE: 97.6 F | SYSTOLIC BLOOD PRESSURE: 160 MMHG | BODY MASS INDEX: 33.43 KG/M2 | HEART RATE: 74 BPM | WEIGHT: 220.6 LBS | OXYGEN SATURATION: 96 % | DIASTOLIC BLOOD PRESSURE: 107 MMHG

## 2024-12-23 DIAGNOSIS — I10 ESSENTIAL (PRIMARY) HYPERTENSION: ICD-10-CM

## 2024-12-23 DIAGNOSIS — H92.03 EAR PAIN, BILATERAL: Primary | ICD-10-CM

## 2024-12-23 DIAGNOSIS — L30.9 DERMATITIS OF EXTERNAL EAR: ICD-10-CM

## 2024-12-23 PROCEDURE — 3077F SYST BP >= 140 MM HG: CPT | Performed by: NURSE PRACTITIONER

## 2024-12-23 PROCEDURE — 99214 OFFICE O/P EST MOD 30 MIN: CPT | Performed by: NURSE PRACTITIONER

## 2024-12-23 PROCEDURE — 3080F DIAST BP >= 90 MM HG: CPT | Performed by: NURSE PRACTITIONER

## 2024-12-23 RX ORDER — LISINOPRIL AND HYDROCHLOROTHIAZIDE 20; 25 MG/1; MG/1
1 TABLET ORAL DAILY
Qty: 90 TABLET | Refills: 1 | Status: SHIPPED | OUTPATIENT
Start: 2024-12-23

## 2024-12-23 NOTE — PROGRESS NOTES
General Office Visit Note    Assessment/Plan:   orders and follow up as documented in EMR    1. Ear pain, bilateral  -     External Referral To Dermatology  2. Essential (primary) hypertension  -     lisinopril-hydroCHLOROthiazide (PRINZIDE;ZESTORETIC) 20-25 MG per tablet; Take 1 tablet by mouth daily, Disp-90 tablet, R-1Normal  3. Dermatitis of external ear  Comments:  Pt was instructed to use Nizoral shampoo to bilateral ear canals until seen by dermatology.  Orders:  -     External Referral To Dermatology     Follow-up and Dispositions    Return for Keep Existing Appointment, In Office (ONLY), NS Visit in 1 Wks for BP Check.       Subjective:     Amador is a 49 y.o. y.o. male who complains of   Chief Complaint   Patient presents with    Ear Pain     Ear Pain:  Patient complains of ear pain and itching. Symptoms include bilateral ear pain, congestion, and itching . Onset of symptoms was saeveral months 3 ago, gradually worsening since that time. He has no c/o    URI symptoms .      Pt states he has not his lisinopril-HCTZ since 12/2023. Pt states he never got the medication, and it was never picked up from the pharmacy in October.      Past Medical History:   Diagnosis Date    Genital herpes 3/2/2015    Genital herpes in men     Hypertension     Prediabetes       Past Surgical History:   Procedure Laterality Date    KNEE ARTHROSCOPY        Social History     Socioeconomic History    Marital status:      Spouse name: None    Number of children: None    Years of education: None    Highest education level: None   Tobacco Use    Smoking status: Never     Passive exposure: Never    Smokeless tobacco: Never   Vaping Use    Vaping status: Never Used   Substance and Sexual Activity    Alcohol use: No    Drug use: No    Sexual activity: Not Currently     Social Determinants of Health     Financial Resource Strain: Low Risk  (4/14/2023)    Overall Financial Resource Strain (CARDIA)     Difficulty of Paying Living

## 2024-12-30 ENCOUNTER — NURSE ONLY (OUTPATIENT)
Facility: CLINIC | Age: 49
End: 2024-12-30

## 2024-12-30 VITALS
WEIGHT: 220 LBS | DIASTOLIC BLOOD PRESSURE: 75 MMHG | HEIGHT: 68 IN | HEART RATE: 84 BPM | BODY MASS INDEX: 33.34 KG/M2 | TEMPERATURE: 97.2 F | OXYGEN SATURATION: 96 % | SYSTOLIC BLOOD PRESSURE: 115 MMHG

## 2024-12-30 DIAGNOSIS — I10 ESSENTIAL (PRIMARY) HYPERTENSION: Primary | ICD-10-CM

## 2025-03-25 ENCOUNTER — TELEPHONE (OUTPATIENT)
Facility: CLINIC | Age: 50
End: 2025-03-25

## 2025-03-25 NOTE — TELEPHONE ENCOUNTER
Left vm for patient to contact the office to reschedule appt from 4/8/2025 due to provider being out of the office.

## 2025-05-01 DIAGNOSIS — I10 ESSENTIAL (PRIMARY) HYPERTENSION: ICD-10-CM

## 2025-05-01 NOTE — TELEPHONE ENCOUNTER
Last Visit: 12/23/24   Next Appointment: none    Requested Prescriptions     Pending Prescriptions Disp Refills    metoprolol succinate (TOPROL XL) 50 MG extended release tablet [Pharmacy Med Name: METOPROLOL SUCC ER 50 MG TAB] 30 tablet      Sig: take 1 tablet by mouth daily (APPT NEEDED PRIOR TO NEXT REFILL)

## 2025-05-02 RX ORDER — METOPROLOL SUCCINATE 50 MG/1
TABLET, EXTENDED RELEASE ORAL
Qty: 30 TABLET | OUTPATIENT
Start: 2025-05-02

## 2025-05-06 NOTE — TELEPHONE ENCOUNTER
Attempted to contact patient to schedule a required in office visit before further refills can be issued. No answer, unable to lvm ,mailbox full

## 2025-08-14 ENCOUNTER — TELEMEDICINE (OUTPATIENT)
Facility: CLINIC | Age: 50
End: 2025-08-14
Payer: COMMERCIAL

## 2025-08-14 DIAGNOSIS — R05.1 ACUTE COUGH: Primary | ICD-10-CM

## 2025-08-14 DIAGNOSIS — R09.89 CHEST CONGESTION: ICD-10-CM

## 2025-08-14 DIAGNOSIS — R09.82 PND (POST-NASAL DRIP): ICD-10-CM

## 2025-08-14 PROCEDURE — 99214 OFFICE O/P EST MOD 30 MIN: CPT | Performed by: NURSE PRACTITIONER
